# Patient Record
Sex: MALE | NOT HISPANIC OR LATINO | URBAN - METROPOLITAN AREA
[De-identification: names, ages, dates, MRNs, and addresses within clinical notes are randomized per-mention and may not be internally consistent; named-entity substitution may affect disease eponyms.]

---

## 2023-03-26 PROBLEM — G93.89 BRAIN MASS: Status: ACTIVE | Noted: 2023-03-26

## 2023-03-26 PROBLEM — J44.9 COPD (CHRONIC OBSTRUCTIVE PULMONARY DISEASE) (MULTI): Status: ACTIVE | Noted: 2023-03-26

## 2023-03-26 PROBLEM — R53.1 LEFT-SIDED WEAKNESS: Status: ACTIVE | Noted: 2023-03-26

## 2023-03-26 PROBLEM — I10 HYPERTENSION, ESSENTIAL: Status: ACTIVE | Noted: 2023-03-26

## 2023-03-26 NOTE — PROGRESS NOTES
PROGRESS NOTE  Subjective   Chief complaint: Amber De La Rosa is a 82 y.o. male who is an acute skilled patient being seen and evaluated for weakness    HPI:  3/27/2023 patient admitted to skilled nursing facility for therapy due to weakness after recent hospitalization for new onset left-sided weakness.  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  CT chest with 12 mm pulmonary nodule in the left lower lobe and 6 mm nodule in the right middle lobe.  Patient was admitted to the hospital and seen by neurology and neurosurgery.  He underwent surgery on brain and is now admitted to skilled nursing facility for therapy.      Objective   Vital signs: 125/69, 20, 97.7, 96%, blood sugar 153    Physical Exam  Constitutional:       General: He is not in acute distress.  Eyes:      Extraocular Movements: Extraocular movements intact.   Cardiovascular:      Rate and Rhythm: Regular rhythm.   Pulmonary:      Effort: Pulmonary effort is normal.      Breath sounds: Normal breath sounds.   Abdominal:      General: Bowel sounds are normal.      Palpations: Abdomen is soft.   Musculoskeletal:      Cervical back: Neck supple.      Right lower leg: No edema.      Left lower leg: No edema.      Comments: Left-sided weakness   Skin:     Comments: Small incision to top of scalp edges approximated no redness no drainage   Neurological:      Mental Status: He is alert.   Psychiatric:         Mood and Affect: Mood normal.         Behavior: Behavior is cooperative.         Assessment/Plan   Problem List Items Addressed This Visit       Brain mass     Follow-up with specialist         Hypertension, essential     Continue antihypertensives  Monitor blood pressure         Left-sided weakness - Primary     Therapy to eval and treat          Medications, treatments, and labs reviewed  Continue medications and treatments as listed in [facility]    Rachel Gaitan, APRN-CNP

## 2023-03-27 ENCOUNTER — NURSING HOME VISIT (OUTPATIENT)
Dept: POST ACUTE CARE | Facility: EXTERNAL LOCATION | Age: 83
End: 2023-03-27
Payer: COMMERCIAL

## 2023-03-27 DIAGNOSIS — R53.1 LEFT-SIDED WEAKNESS: Primary | ICD-10-CM

## 2023-03-27 DIAGNOSIS — G93.89 BRAIN MASS: ICD-10-CM

## 2023-03-27 DIAGNOSIS — I10 HYPERTENSION, ESSENTIAL: ICD-10-CM

## 2023-03-27 PROCEDURE — 99308 SBSQ NF CARE LOW MDM 20: CPT | Performed by: NURSE PRACTITIONER

## 2023-03-27 NOTE — LETTER
Patient: Amber De La Rosa  : 1940    Encounter Date: 2023    PROGRESS NOTE  Subjective  Chief complaint: Amber De La Rosa is a 82 y.o. male who is an acute skilled patient being seen and evaluated for weakness    HPI:  3/27/2023 patient admitted to skilled nursing facility for therapy due to weakness after recent hospitalization for new onset left-sided weakness.  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  CT chest with 12 mm pulmonary nodule in the left lower lobe and 6 mm nodule in the right middle lobe.  Patient was admitted to the hospital and seen by neurology and neurosurgery.  He underwent surgery on brain and is now admitted to skilled nursing facility for therapy.      Objective  Vital signs: 125/69, 20, 97.7, 96%, blood sugar 153    Physical Exam  Constitutional:       General: He is not in acute distress.  Eyes:      Extraocular Movements: Extraocular movements intact.   Cardiovascular:      Rate and Rhythm: Regular rhythm.   Pulmonary:      Effort: Pulmonary effort is normal.      Breath sounds: Normal breath sounds.   Abdominal:      General: Bowel sounds are normal.      Palpations: Abdomen is soft.   Musculoskeletal:      Cervical back: Neck supple.      Right lower leg: No edema.      Left lower leg: No edema.      Comments: Left-sided weakness   Skin:     Comments: Small incision to top of scalp edges approximated no redness no drainage   Neurological:      Mental Status: He is alert.   Psychiatric:         Mood and Affect: Mood normal.         Behavior: Behavior is cooperative.         Assessment/Plan  Problem List Items Addressed This Visit       Brain mass     Follow-up with specialist         Hypertension, essential     Continue antihypertensives  Monitor blood pressure         Left-sided weakness - Primary     Therapy to eval and treat          Medications, treatments, and labs reviewed  Continue medications and treatments as listed in [facility]    Rachel Gaitan,  APRN-CNP      Electronically Signed By: MICHEL Canales   3/27/23  6:51 PM

## 2023-03-28 ENCOUNTER — NURSING HOME VISIT (OUTPATIENT)
Dept: POST ACUTE CARE | Facility: EXTERNAL LOCATION | Age: 83
End: 2023-03-28
Payer: COMMERCIAL

## 2023-03-28 DIAGNOSIS — R53.1 LEFT-SIDED WEAKNESS: ICD-10-CM

## 2023-03-28 DIAGNOSIS — I10 HYPERTENSION, ESSENTIAL: ICD-10-CM

## 2023-03-28 DIAGNOSIS — G93.89 BRAIN MASS: Primary | ICD-10-CM

## 2023-03-28 PROCEDURE — 99306 1ST NF CARE HIGH MDM 50: CPT | Performed by: INTERNAL MEDICINE

## 2023-03-28 NOTE — PROGRESS NOTES
HISTORY & PHYSICAL  Subjective   Chief complaint: Amber De La Rosa is a 82 y.o. male who is a acute skilled care patient being seen and evaluated for multiple medical problems.  Patient presents for weakness    HPI:  82 years old male with past medical history of brain tumor, polyp, GERD, hyperlipidemia, hypertension.  Patient admitted to skilled nursing facility for therapy due to weakness after recent hospitalization for new onset left-sided weakness.  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  CT chest with 12 mm pulmonary nodule in the left lower lobe and 6 mm nodule in the right middle lobe.  Patient was admitted to the hospital and seen by neurology and neurosurgery.  He underwent surgery on brain and is now admitted to skilled nursing facility for therapy.        Past Medical History:   Diagnosis Date    Colon polyps     GERD (gastroesophageal reflux disease)     Hyperlipidemia     Hypertension, essential        Past Surgical History:   Procedure Laterality Date    COLONOSCOPY      UPPER GASTROINTESTINAL ENDOSCOPY         Family History   Problem Relation Name Age of Onset    COPD Father      Leukemia Sister      Uterine cancer Sister      Breast cancer Sister      Throat cancer Brother         Social History     Socioeconomic History    Marital status:      Spouse name: Not on file    Number of children: Not on file    Years of education: Not on file    Highest education level: Not on file   Occupational History    Not on file   Tobacco Use    Smoking status: Never    Smokeless tobacco: Not on file   Vaping Use    Vaping status: Not on file   Substance and Sexual Activity    Alcohol use: Yes    Drug use: Not on file    Sexual activity: Not on file   Other Topics Concern    Not on file   Social History Narrative    Not on file     Social Determinants of Health     Financial Resource Strain: Not on file   Food Insecurity: Not on file   Transportation Needs: Not on file    Physical Activity: Not on file   Stress: Not on file   Social Connections: Not on file   Intimate Partner Violence: Not on file   Housing Stability: Not on file       Vital signs: 130 over 80/80/18    Objective   Physical Exam  Vitals reviewed.   Constitutional:       Appearance: Normal appearance.   HENT:      Head: Normocephalic and atraumatic.   Cardiovascular:      Rate and Rhythm: Normal rate and regular rhythm.   Pulmonary:      Effort: Pulmonary effort is normal.      Breath sounds: Normal breath sounds.   Abdominal:      General: Bowel sounds are normal.      Palpations: Abdomen is soft.   Musculoskeletal:      Cervical back: Neck supple.   Skin:     General: Skin is warm and dry.   Neurological:      General: No focal deficit present.      Mental Status: He is alert.      Comments: Right-sided weakness   Psychiatric:         Mood and Affect: Mood normal.         Behavior: Behavior is cooperative.         Assessment/Plan   Problem List Items Addressed This Visit          Nervous    Left-sided weakness       Circulatory    Hypertension, essential     Continue antihypertensives  Monitor blood pressure            Other    Brain mass - Primary     Brain tumor underwent neurosurgery sent to the nursing home for rehab          Medications, treatments, and labs reviewed  Continue medications and treatments as listed in [facility]    Dasia Barton MD

## 2023-03-28 NOTE — LETTER
Patient: Amber De La Rosa  : 1940    Encounter Date: 2023    HISTORY & PHYSICAL  Subjective  Chief complaint: Amber De La Rosa is a 82 y.o. male who is a acute skilled care patient being seen and evaluated for multiple medical problems.  Patient presents for weakness    HPI:  82 years old male with past medical history of brain tumor, polyp, GERD, hyperlipidemia, hypertension.  Patient admitted to skilled nursing facility for therapy due to weakness after recent hospitalization for new onset left-sided weakness.  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  CT chest with 12 mm pulmonary nodule in the left lower lobe and 6 mm nodule in the right middle lobe.  Patient was admitted to the hospital and seen by neurology and neurosurgery.  He underwent surgery on brain and is now admitted to skilled nursing facility for therapy.        Past Medical History:   Diagnosis Date   • Colon polyps    • GERD (gastroesophageal reflux disease)    • Hyperlipidemia    • Hypertension, essential        Past Surgical History:   Procedure Laterality Date   • COLONOSCOPY     • UPPER GASTROINTESTINAL ENDOSCOPY         Family History   Problem Relation Name Age of Onset   • COPD Father     • Leukemia Sister     • Uterine cancer Sister     • Breast cancer Sister     • Throat cancer Brother         Social History     Socioeconomic History   • Marital status:      Spouse name: Not on file   • Number of children: Not on file   • Years of education: Not on file   • Highest education level: Not on file   Occupational History   • Not on file   Tobacco Use   • Smoking status: Never   • Smokeless tobacco: Not on file   Vaping Use   • Vaping status: Not on file   Substance and Sexual Activity   • Alcohol use: Yes   • Drug use: Not on file   • Sexual activity: Not on file   Other Topics Concern   • Not on file   Social History Narrative   • Not on file     Social Determinants of Health     Financial Resource  Strain: Not on file   Food Insecurity: Not on file   Transportation Needs: Not on file   Physical Activity: Not on file   Stress: Not on file   Social Connections: Not on file   Intimate Partner Violence: Not on file   Housing Stability: Not on file       Vital signs: 130 over 80/80/18    Objective  Physical Exam  Vitals reviewed.   Constitutional:       Appearance: Normal appearance.   HENT:      Head: Normocephalic and atraumatic.   Cardiovascular:      Rate and Rhythm: Normal rate and regular rhythm.   Pulmonary:      Effort: Pulmonary effort is normal.      Breath sounds: Normal breath sounds.   Abdominal:      General: Bowel sounds are normal.      Palpations: Abdomen is soft.   Musculoskeletal:      Cervical back: Neck supple.   Skin:     General: Skin is warm and dry.   Neurological:      General: No focal deficit present.      Mental Status: He is alert.      Comments: Right-sided weakness   Psychiatric:         Mood and Affect: Mood normal.         Behavior: Behavior is cooperative.         Assessment/Plan  Problem List Items Addressed This Visit    None    Medications, treatments, and labs reviewed  Continue medications and treatments as listed in [facility]    Dasia Barton MD      Electronically Signed By: Dasia Barton MD   3/28/23  7:15 PM

## 2023-03-29 ENCOUNTER — NURSING HOME VISIT (OUTPATIENT)
Dept: POST ACUTE CARE | Facility: EXTERNAL LOCATION | Age: 83
End: 2023-03-29
Payer: COMMERCIAL

## 2023-03-29 DIAGNOSIS — I10 HYPERTENSION, ESSENTIAL: ICD-10-CM

## 2023-03-29 DIAGNOSIS — G93.89 BRAIN MASS: ICD-10-CM

## 2023-03-29 DIAGNOSIS — R53.1 LEFT-SIDED WEAKNESS: Primary | ICD-10-CM

## 2023-03-29 PROCEDURE — 99308 SBSQ NF CARE LOW MDM 20: CPT | Performed by: NURSE PRACTITIONER

## 2023-03-29 NOTE — LETTER
Patient: Amber De La Rosa  : 1940    Encounter Date: 2023    PROGRESS NOTE  Subjective  Chief complaint: Amber De La Rosa is a 82 y.o. male who is an acute skilled patient being seen and evaluated for weakness    HPI:  3/28/2023 82 years old male with past medical history of brain tumor, polyp, GERD, hyperlipidemia, hypertension.  Patient admitted to skilled nursing facility for therapy due to weakness after recent hospitalization for new onset left-sided weakness.  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  CT chest with 12 mm pulmonary nodule in the left lower lobe and 6 mm nodule in the right middle lobe.  Patient was admitted to the hospital and seen by neurology and neurosurgery.  He underwent surgery on brain and is now admitted to skilled nursing facility for therapy.    3/29/2023 patient is working in PT [facility] and [employer].  He is able to walk up to 10 feet in the parallel bars with moderate to maximum assist with close wheelchair follow.  He requires moderate to maximum assist for sit to stand with pulling up on the parallel bars.  Speech therapy is working with him on safe swallowing and cognition.  Daughter at bedside and reviewed medication regimen.  No new concerns today.      Objective  Vital signs: 129/69, 16, 97.1, 73, 98%, blood sugar 124    Physical Exam  Constitutional:       General: He is not in acute distress.  Eyes:      Extraocular Movements: Extraocular movements intact.   Cardiovascular:      Rate and Rhythm: Regular rhythm.   Pulmonary:      Effort: Pulmonary effort is normal.      Breath sounds: Normal breath sounds.   Abdominal:      General: Bowel sounds are normal.      Palpations: Abdomen is soft.   Musculoskeletal:      Cervical back: Neck supple.      Right lower leg: No edema.      Left lower leg: No edema.      Comments: Left-sided weakness   Skin:     Comments: Small incision to top of scalp edges approximated no redness no drainage   Neurological:       Mental Status: He is alert.   Psychiatric:         Mood and Affect: Mood normal.         Behavior: Behavior is cooperative.         Assessment/Plan  Problem List Items Addressed This Visit       Brain mass     Follow-up with specialist         Hypertension, essential     Controlled  Continue antihypertensives  Monitor blood pressure         Left-sided weakness - Primary     Continue therapy          Medications, treatments, and labs reviewed  Continue medications and treatments as listed in [facility]    MICHEL Canales      Electronically Signed By: MICHEL Canales   3/30/23  1:28 PM

## 2023-03-30 ENCOUNTER — NURSING HOME VISIT (OUTPATIENT)
Dept: POST ACUTE CARE | Facility: EXTERNAL LOCATION | Age: 83
End: 2023-03-30
Payer: COMMERCIAL

## 2023-03-30 DIAGNOSIS — E87.1 HYPONATREMIA: ICD-10-CM

## 2023-03-30 DIAGNOSIS — I10 HYPERTENSION, ESSENTIAL: ICD-10-CM

## 2023-03-30 DIAGNOSIS — G93.89 BRAIN MASS: ICD-10-CM

## 2023-03-30 DIAGNOSIS — R53.1 LEFT-SIDED WEAKNESS: Primary | ICD-10-CM

## 2023-03-30 PROCEDURE — 99308 SBSQ NF CARE LOW MDM 20: CPT | Performed by: NURSE PRACTITIONER

## 2023-03-30 NOTE — LETTER
Patient: Amber De La Rosa  : 1940    Encounter Date: 2023    PROGRESS NOTE  Subjective  Chief complaint: Amber De La Rosa is a 82 y.o. male who is an acute skilled patient being seen and evaluated for weakness and abnormal lab    HPI:  3/28/2023 82 years old male with past medical history of brain tumor, polyp, GERD, hyperlipidemia, hypertension.  Patient admitted to skilled nursing facility for therapy due to weakness after recent hospitalization for new onset left-sided weakness.  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  CT chest with 12 mm pulmonary nodule in the left lower lobe and 6 mm nodule in the right middle lobe.  Patient was admitted to the hospital and seen by neurology and neurosurgery.  He underwent surgery on brain and is now admitted to skilled nursing facility for therapy.    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  He is able to walk up to 10 feet in the parallel bars with moderate to maximum assist with close wheelchair follow.  He requires moderate to maximum assist for sit to stand with pulling up on the parallel bars.  Speech therapy is working with him on safe swallowing and cognition.  Daughter at bedside and reviewed medication regimen.  No new concerns today.     patient is at skilled nursing facility for therapy.  He had follow-up with specialist yesterday and returned with new orders for memantine.  Nurse reported that the patient's labs were obtained at appointment and showed sodium 124.  Patient was placed on a fluid restriction and sent back to nursing facility.  Family at bedside today to discuss plan of care.  Family states patient has been more sleepy than usual today although notes that he did not sleep well last night.  Patient is awake and talking/interacting in conversation.        Objective  Vital signs: 128/65, 17, 97.5, 70, 98%, blood sugar 204    Physical Exam  Constitutional:       General: He is not in acute  distress.  Eyes:      Extraocular Movements: Extraocular movements intact.   Cardiovascular:      Rate and Rhythm: Regular rhythm.   Pulmonary:      Effort: Pulmonary effort is normal.      Breath sounds: Normal breath sounds.   Abdominal:      General: Bowel sounds are normal.      Palpations: Abdomen is soft.   Musculoskeletal:      Cervical back: Neck supple.      Right lower leg: No edema.      Left lower leg: No edema.      Comments: Left-sided weakness   Skin:     Comments: Small incision to top of scalp edges approximated no redness no drainage   Neurological:      Mental Status: He is alert.   Psychiatric:         Mood and Affect: Mood normal.         Behavior: Behavior is cooperative.         Assessment/Plan  Problem List Items Addressed This Visit       Brain mass     Follow-up with specialist         Hypertension, essential     Controlled  Continue antihypertensives  Monitor blood pressure         Hyponatremia     Patient is slightly sleepy but interacting appropriately  Will repeat BMP stat  Family aware and agrees with plan         Left-sided weakness - Primary     Continue therapy          Medications, treatments, and labs reviewed  Continue medications and treatments as listed in [facility]    MICHEL Canales      Electronically Signed By: MICHEL Canales   3/30/23  5:17 PM

## 2023-03-30 NOTE — PROGRESS NOTES
PROGRESS NOTE  Subjective   Chief complaint: Amber De La Rosa is a 82 y.o. male who is an acute skilled patient being seen and evaluated for weakness and abnormal lab    HPI:  3/28/2023 82 years old male with past medical history of brain tumor, polyp, GERD, hyperlipidemia, hypertension.  Patient admitted to skilled nursing facility for therapy due to weakness after recent hospitalization for new onset left-sided weakness.  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  CT chest with 12 mm pulmonary nodule in the left lower lobe and 6 mm nodule in the right middle lobe.  Patient was admitted to the hospital and seen by neurology and neurosurgery.  He underwent surgery on brain and is now admitted to skilled nursing facility for therapy.    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  He is able to walk up to 10 feet in the parallel bars with moderate to maximum assist with close wheelchair follow.  He requires moderate to maximum assist for sit to stand with pulling up on the parallel bars.  Speech therapy is working with him on safe swallowing and cognition.  Daughter at bedside and reviewed medication regimen.  No new concerns today.    2/30/2023 patient is at skilled nursing facility for therapy.  He had follow-up with specialist yesterday and returned with new orders for memantine.  Nurse reported that the patient's labs were obtained at appointment and showed sodium 124.  Patient was placed on a fluid restriction and sent back to nursing facility.  Family at bedside today to discuss plan of care.  Family states patient has been more sleepy than usual today although notes that he did not sleep well last night.  Patient is awake and talking/interacting in conversation.        Objective   Vital signs: 128/65, 17, 97.5, 70, 98%, blood sugar 204    Physical Exam  Constitutional:       General: He is not in acute distress.  Eyes:      Extraocular Movements: Extraocular movements intact.    Cardiovascular:      Rate and Rhythm: Regular rhythm.   Pulmonary:      Effort: Pulmonary effort is normal.      Breath sounds: Normal breath sounds.   Abdominal:      General: Bowel sounds are normal.      Palpations: Abdomen is soft.   Musculoskeletal:      Cervical back: Neck supple.      Right lower leg: No edema.      Left lower leg: No edema.      Comments: Left-sided weakness   Skin:     Comments: Small incision to top of scalp edges approximated no redness no drainage   Neurological:      Mental Status: He is alert.   Psychiatric:         Mood and Affect: Mood normal.         Behavior: Behavior is cooperative.         Assessment/Plan   Problem List Items Addressed This Visit       Brain mass     Follow-up with specialist         Hypertension, essential     Controlled  Continue antihypertensives  Monitor blood pressure         Hyponatremia     Patient is slightly sleepy but interacting appropriately  Will repeat BMP stat  Family aware and agrees with plan         Left-sided weakness - Primary     Continue therapy          Medications, treatments, and labs reviewed  Continue medications and treatments as listed in [facility]    KETTY Canales-CNP

## 2023-03-30 NOTE — ASSESSMENT & PLAN NOTE
Patient is slightly sleepy but interacting appropriately  Will repeat BMP stat  Family aware and agrees with plan

## 2023-03-30 NOTE — PROGRESS NOTES
PROGRESS NOTE  Subjective   Chief complaint: Amber De La Rosa is a 82 y.o. male who is an acute skilled patient being seen and evaluated for weakness    HPI:  3/28/2023 82 years old male with past medical history of brain tumor, polyp, GERD, hyperlipidemia, hypertension.  Patient admitted to skilled nursing facility for therapy due to weakness after recent hospitalization for new onset left-sided weakness.  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  CT chest with 12 mm pulmonary nodule in the left lower lobe and 6 mm nodule in the right middle lobe.  Patient was admitted to the hospital and seen by neurology and neurosurgery.  He underwent surgery on brain and is now admitted to skilled nursing facility for therapy.    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  He is able to walk up to 10 feet in the parallel bars with moderate to maximum assist with close wheelchair follow.  He requires moderate to maximum assist for sit to stand with pulling up on the parallel bars.  Speech therapy is working with him on safe swallowing and cognition.  Daughter at bedside and reviewed medication regimen.  No new concerns today.      Objective   Vital signs: 129/69, 16, 97.1, 73, 98%, blood sugar 124    Physical Exam  Constitutional:       General: He is not in acute distress.  Eyes:      Extraocular Movements: Extraocular movements intact.   Cardiovascular:      Rate and Rhythm: Regular rhythm.   Pulmonary:      Effort: Pulmonary effort is normal.      Breath sounds: Normal breath sounds.   Abdominal:      General: Bowel sounds are normal.      Palpations: Abdomen is soft.   Musculoskeletal:      Cervical back: Neck supple.      Right lower leg: No edema.      Left lower leg: No edema.      Comments: Left-sided weakness   Skin:     Comments: Small incision to top of scalp edges approximated no redness no drainage   Neurological:      Mental Status: He is alert.   Psychiatric:         Mood and  Affect: Mood normal.         Behavior: Behavior is cooperative.         Assessment/Plan   Problem List Items Addressed This Visit       Brain mass     Follow-up with specialist         Hypertension, essential     Controlled  Continue antihypertensives  Monitor blood pressure         Left-sided weakness - Primary     Continue therapy          Medications, treatments, and labs reviewed  Continue medications and treatments as listed in [facility]    Rachel Gaitan, KETTY-CNP

## 2023-03-31 ENCOUNTER — NURSING HOME VISIT (OUTPATIENT)
Dept: POST ACUTE CARE | Facility: EXTERNAL LOCATION | Age: 83
End: 2023-03-31
Payer: COMMERCIAL

## 2023-03-31 DIAGNOSIS — R53.1 WEAKNESS: ICD-10-CM

## 2023-03-31 DIAGNOSIS — Z79.4 TYPE 2 DIABETES MELLITUS WITH OTHER SPECIFIED COMPLICATION, WITH LONG-TERM CURRENT USE OF INSULIN (MULTI): ICD-10-CM

## 2023-03-31 DIAGNOSIS — E11.69 TYPE 2 DIABETES MELLITUS WITH OTHER SPECIFIED COMPLICATION, WITH LONG-TERM CURRENT USE OF INSULIN (MULTI): ICD-10-CM

## 2023-03-31 DIAGNOSIS — I10 HYPERTENSION, ESSENTIAL: ICD-10-CM

## 2023-03-31 PROCEDURE — 99308 SBSQ NF CARE LOW MDM 20: CPT | Performed by: INTERNAL MEDICINE

## 2023-03-31 NOTE — LETTER
Patient: Amber De La Rosa  : 1940    Encounter Date: 2023    Subjective  Chief complaint: Amber De La Rosa is a 82 y.o. male who is a acute skilled care patient being seen and evaluated for weakness    HPI:  3/28/2023 82 years old male with past medical history of brain tumor, polyp, GERD, hyperlipidemia, hypertension.  Patient admitted to skilled nursing facility for therapy due to weakness after recent hospitalization for new onset left-sided weakness.  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  CT chest with 12 mm pulmonary nodule in the left lower lobe and 6 mm nodule in the right middle lobe.  Patient was admitted to the hospital and seen by neurology and neurosurgery.  He underwent surgery on brain and is now admitted to skilled nursing facility for therapy.    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  He is able to walk up to 10 feet in the parallel bars with moderate to maximum assist with close wheelchair follow.  He requires moderate to maximum assist for sit to stand with pulling up on the parallel bars.  Speech therapy is working with him on safe swallowing and cognition.  Daughter at bedside and reviewed medication regimen.  No new concerns today.    3/30/2023 patient is at skilled nursing facility for therapy.  He had follow-up with specialist yesterday and returned with new orders for memantine.  Nurse reported that the patient's labs were obtained at appointment and showed sodium 124.  Patient was placed on a fluid restriction and sent back to nursing facility.  Family at bedside today to discuss plan of care.  Family states patient has been more sleepy than usual today although notes that he did not sleep well last night.  Patient is awake and talking/interacting in conversation.      3/31/23 Patient working in therapy. He is doing well and in good spirits. He is getting stronger. Patient  able to walk up to 10 feet in the parallel bars with moderate to maximum  assist with close wheelchair follow. No acute distress at this time.         Review of Systems  All systems reviewed and negative except for what was mentioned in the HPI    Vital signs: 122/64, 64, 18, 99%     Objective  Physical Exam  Constitutional:       General: He is not in acute distress.  Eyes:      Extraocular Movements: Extraocular movements intact.   Cardiovascular:      Rate and Rhythm: Regular rhythm.   Pulmonary:      Effort: Pulmonary effort is normal.      Breath sounds: Normal breath sounds.   Abdominal:      General: Bowel sounds are normal.      Palpations: Abdomen is soft.   Musculoskeletal:      Cervical back: Neck supple.      Right lower leg: No edema.      Left lower leg: No edema.   Neurological:      Mental Status: He is alert.   Psychiatric:         Mood and Affect: Mood normal.         Behavior: Behavior is cooperative.         Assessment/Plan  Problem List Items Addressed This Visit          Circulatory    Hypertension, essential     Controlled  Continue antihypertensives  Monitor blood pressure            Endocrine/Metabolic    DM (diabetes mellitus) (CMS/[facility])     Monitor BS  Continue SSI            Other    Weakness     Continue therapy          Medications, treatments, and labs reviewed  Continue medications and treatments as listed in PCC    Scribe Attestation  By signing my name below, I, Nyla Stephens, Scribe   attest that this documentation has been prepared under the direction and in the presence of Dasia Barton MD.    Provider Attestation - Scribe documentation  All medical record entries made by the Scribe were at my direction and personally dictated by me. I have reviewed the chart and agree that the record accurately reflects my personal performance of the history, physical exam, discussion and plan.      Electronically Signed By: Dasia Barton MD   4/3/23  5:42 PM

## 2023-04-03 ENCOUNTER — NURSING HOME VISIT (OUTPATIENT)
Dept: POST ACUTE CARE | Facility: EXTERNAL LOCATION | Age: 83
End: 2023-04-03
Payer: COMMERCIAL

## 2023-04-03 DIAGNOSIS — R13.10 DYSPHAGIA, UNSPECIFIED TYPE: ICD-10-CM

## 2023-04-03 DIAGNOSIS — G93.89 BRAIN MASS: ICD-10-CM

## 2023-04-03 DIAGNOSIS — R53.1 WEAKNESS: Primary | ICD-10-CM

## 2023-04-03 DIAGNOSIS — I10 HYPERTENSION, ESSENTIAL: ICD-10-CM

## 2023-04-03 PROBLEM — E11.9 DM (DIABETES MELLITUS) (MULTI): Status: ACTIVE | Noted: 2023-04-03

## 2023-04-03 PROCEDURE — 99308 SBSQ NF CARE LOW MDM 20: CPT | Performed by: NURSE PRACTITIONER

## 2023-04-03 NOTE — LETTER
Patient: Amber De La Rosa  : 1940    Encounter Date: 2023    PROGRESS NOTE    Subjective  Chief complaint: Amber De La Rosa is a 82 y.o. male who is an acute skilled patient being seen and evaluated for weakness    HPI:  3/28/2023 82 years old male with past medical history of brain tumor, polyp, GERD, hyperlipidemia, hypertension.  Patient admitted to skilled nursing facility for therapy due to weakness after recent hospitalization for new onset left-sided weakness.  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  CT chest with 12 mm pulmonary nodule in the left lower lobe and 6 mm nodule in the right middle lobe.  Patient was admitted to the hospital and seen by neurology and neurosurgery.  He underwent surgery on brain and is now admitted to skilled nursing facility for therapy.    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  He is able to walk up to 10 feet in the parallel bars with moderate to maximum assist with close wheelchair follow.  He requires moderate to maximum assist for sit to stand with pulling up on the parallel bars.  Speech therapy is working with him on safe swallowing and cognition.  Daughter at bedside and reviewed medication regimen.  No new concerns today.    3/30/2023 patient is at skilled nursing facility for therapy.  He had follow-up with specialist yesterday and returned with new orders for memantine.  Nurse reported that the patient's labs were obtained at appointment and showed sodium 124.  Patient was placed on a fluid restriction and sent back to nursing facility.  Family at bedside today to discuss plan of care.  Family states patient has been more sleepy than usual today although notes that he did not sleep well last night.  Patient is awake and talking/interacting in conversation.      3/31/23 Patient working in therapy. He is doing well and in good spirits. He is getting stronger. Patient  able to walk up to 10 feet in the parallel bars with  moderate to maximum assist with close wheelchair follow. No acute distress at this time.     4/3/23 Patient is at SNF for therapy.  He is able to walk short distances up to 10 feet in parallel bars with moderate to maximum assist.  He is also working with speech therapy for dysphagia.  Patient states he feels well and has no new concerns today.      Objective  Vital signs: 113/55,     Physical Exam  Constitutional:       General: He is not in acute distress.  Eyes:      Extraocular Movements: Extraocular movements intact.   Cardiovascular:      Rate and Rhythm: Regular rhythm.   Pulmonary:      Effort: Pulmonary effort is normal.      Breath sounds: Normal breath sounds.   Abdominal:      General: Bowel sounds are normal.      Palpations: Abdomen is soft.   Musculoskeletal:      Cervical back: Neck supple.      Right lower leg: No edema.      Left lower leg: No edema.      Comments: Left-sided weakness   Neurological:      Mental Status: He is alert.   Psychiatric:         Mood and Affect: Mood normal.         Behavior: Behavior is cooperative.         Assessment/Plan  Problem List Items Addressed This Visit       Brain mass     Follow-up with specialist           Dysphagia     Soft diet  ST         Hypertension, essential     Controlled  Continue antihypertensives  Monitor blood pressure         Weakness - Primary     Continue therapy           Medications, treatments, and labs reviewed  Continue medications and treatments as listed in [facility]    MICHEL Canales      Electronically Signed By: MICHEL Canales   4/4/23  7:36 PM

## 2023-04-03 NOTE — PROGRESS NOTES
Subjective   Chief complaint: Amber De La Rosa is a 82 y.o. male who is a acute skilled care patient being seen and evaluated for weakness    HPI:  3/28/2023 82 years old male with past medical history of brain tumor, polyp, GERD, hyperlipidemia, hypertension.  Patient admitted to skilled nursing facility for therapy due to weakness after recent hospitalization for new onset left-sided weakness.  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  CT chest with 12 mm pulmonary nodule in the left lower lobe and 6 mm nodule in the right middle lobe.  Patient was admitted to the hospital and seen by neurology and neurosurgery.  He underwent surgery on brain and is now admitted to skilled nursing facility for therapy.    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  He is able to walk up to 10 feet in the parallel bars with moderate to maximum assist with close wheelchair follow.  He requires moderate to maximum assist for sit to stand with pulling up on the parallel bars.  Speech therapy is working with him on safe swallowing and cognition.  Daughter at bedside and reviewed medication regimen.  No new concerns today.    3/30/2023 patient is at skilled nursing facility for therapy.  He had follow-up with specialist yesterday and returned with new orders for memantine.  Nurse reported that the patient's labs were obtained at appointment and showed sodium 124.  Patient was placed on a fluid restriction and sent back to nursing facility.  Family at bedside today to discuss plan of care.  Family states patient has been more sleepy than usual today although notes that he did not sleep well last night.  Patient is awake and talking/interacting in conversation.      3/31/23 Patient working in therapy. He is doing well and in good spirits. He is getting stronger. Patient  able to walk up to 10 feet in the parallel bars with moderate to maximum assist with close wheelchair follow. No acute distress at this  time.         Review of Systems  All systems reviewed and negative except for what was mentioned in the HPI    Vital signs: 122/64, 64, 18, 99%     Objective   Physical Exam  Constitutional:       General: He is not in acute distress.  Eyes:      Extraocular Movements: Extraocular movements intact.   Cardiovascular:      Rate and Rhythm: Regular rhythm.   Pulmonary:      Effort: Pulmonary effort is normal.      Breath sounds: Normal breath sounds.   Abdominal:      General: Bowel sounds are normal.      Palpations: Abdomen is soft.   Musculoskeletal:      Cervical back: Neck supple.      Right lower leg: No edema.      Left lower leg: No edema.   Neurological:      Mental Status: He is alert.   Psychiatric:         Mood and Affect: Mood normal.         Behavior: Behavior is cooperative.         Assessment/Plan   Problem List Items Addressed This Visit          Circulatory    Hypertension, essential     Controlled  Continue antihypertensives  Monitor blood pressure            Endocrine/Metabolic    DM (diabetes mellitus) (CMS/[facility])     Monitor BS  Continue SSI            Other    Weakness     Continue therapy          Medications, treatments, and labs reviewed  Continue medications and treatments as listed in PCC    Scribe Attestation  By signing my name below, INyla Scribe   attest that this documentation has been prepared under the direction and in the presence of Dasia Barton MD.    Provider Attestation - Scribe documentation  All medical record entries made by the Scribe were at my direction and personally dictated by me. I have reviewed the chart and agree that the record accurately reflects my personal performance of the history, physical exam, discussion and plan.

## 2023-04-04 ENCOUNTER — NURSING HOME VISIT (OUTPATIENT)
Dept: POST ACUTE CARE | Facility: EXTERNAL LOCATION | Age: 83
End: 2023-04-04
Payer: COMMERCIAL

## 2023-04-04 DIAGNOSIS — R53.1 WEAKNESS: ICD-10-CM

## 2023-04-04 DIAGNOSIS — Z79.4 TYPE 2 DIABETES MELLITUS WITH OTHER SPECIFIED COMPLICATION, WITH LONG-TERM CURRENT USE OF INSULIN (MULTI): ICD-10-CM

## 2023-04-04 DIAGNOSIS — I10 HYPERTENSION, ESSENTIAL: ICD-10-CM

## 2023-04-04 DIAGNOSIS — E11.69 TYPE 2 DIABETES MELLITUS WITH OTHER SPECIFIED COMPLICATION, WITH LONG-TERM CURRENT USE OF INSULIN (MULTI): ICD-10-CM

## 2023-04-04 PROBLEM — R13.10 DYSPHAGIA: Status: ACTIVE | Noted: 2023-04-04

## 2023-04-04 PROCEDURE — 99308 SBSQ NF CARE LOW MDM 20: CPT | Performed by: INTERNAL MEDICINE

## 2023-04-04 NOTE — PROGRESS NOTES
PROGRESS NOTE    Subjective   Chief complaint: Amber De La Rosa is a 82 y.o. male who is an acute skilled patient being seen and evaluated for weakness    HPI:  3/28/2023 82 years old male with past medical history of brain tumor, polyp, GERD, hyperlipidemia, hypertension.  Patient admitted to skilled nursing facility for therapy due to weakness after recent hospitalization for new onset left-sided weakness.  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  CT chest with 12 mm pulmonary nodule in the left lower lobe and 6 mm nodule in the right middle lobe.  Patient was admitted to the hospital and seen by neurology and neurosurgery.  He underwent surgery on brain and is now admitted to skilled nursing facility for therapy.    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  He is able to walk up to 10 feet in the parallel bars with moderate to maximum assist with close wheelchair follow.  He requires moderate to maximum assist for sit to stand with pulling up on the parallel bars.  Speech therapy is working with him on safe swallowing and cognition.  Daughter at bedside and reviewed medication regimen.  No new concerns today.    3/30/2023 patient is at skilled nursing facility for therapy.  He had follow-up with specialist yesterday and returned with new orders for memantine.  Nurse reported that the patient's labs were obtained at appointment and showed sodium 124.  Patient was placed on a fluid restriction and sent back to nursing facility.  Family at bedside today to discuss plan of care.  Family states patient has been more sleepy than usual today although notes that he did not sleep well last night.  Patient is awake and talking/interacting in conversation.      3/31/23 Patient working in therapy. He is doing well and in good spirits. He is getting stronger. Patient  able to walk up to 10 feet in the parallel bars with moderate to maximum assist with close wheelchair follow. No acute  distress at this time.     4/3/23 Patient is at SNF for therapy.  He is able to walk short distances up to 10 feet in parallel bars with moderate to maximum assist.  He is also working with speech therapy for dysphagia.  Patient states he feels well and has no new concerns today.      Objective   Vital signs: 113/55,     Physical Exam  Constitutional:       General: He is not in acute distress.  Eyes:      Extraocular Movements: Extraocular movements intact.   Cardiovascular:      Rate and Rhythm: Regular rhythm.   Pulmonary:      Effort: Pulmonary effort is normal.      Breath sounds: Normal breath sounds.   Abdominal:      General: Bowel sounds are normal.      Palpations: Abdomen is soft.   Musculoskeletal:      Cervical back: Neck supple.      Right lower leg: No edema.      Left lower leg: No edema.      Comments: Left-sided weakness   Neurological:      Mental Status: He is alert.   Psychiatric:         Mood and Affect: Mood normal.         Behavior: Behavior is cooperative.         Assessment/Plan   Problem List Items Addressed This Visit       Brain mass     Follow-up with specialist           Dysphagia     Soft diet  ST         Hypertension, essential     Controlled  Continue antihypertensives  Monitor blood pressure         Weakness - Primary     Continue therapy           Medications, treatments, and labs reviewed  Continue medications and treatments as listed in PCC    Rachel Gaitan APRN-CNP

## 2023-04-04 NOTE — PROGRESS NOTES
Subjective   Chief complaint: Amber De La Rosa is a 82 y.o. male who is a acute skilled care patient being seen and evaluated for weakness    HPI:    Continues working in therapy due to weakness.   Patient requires moderate assistance for transfers ADLs and mobility at this time.  Denies constitutional complaints.  No acute distress.        Review of Systems  All systems reviewed and negative except for what was mentioned in the HPI    Vital signs:   113/55, 97.1, 51, 18, 97%, blood sugars 153    Objective   Physical Exam  Constitutional:       General: He is not in acute distress.  Eyes:      Extraocular Movements: Extraocular movements intact.   Cardiovascular:      Rate and Rhythm: Regular rhythm.   Pulmonary:      Effort: Pulmonary effort is normal.      Breath sounds: Normal breath sounds.   Abdominal:      General: Bowel sounds are normal.      Palpations: Abdomen is soft.   Musculoskeletal:      Cervical back: Neck supple.      Right lower leg: No edema.      Left lower leg: No edema.   Neurological:      Mental Status: He is alert.   Psychiatric:         Mood and Affect: Mood normal.         Behavior: Behavior is cooperative.         Assessment/Plan   Problem List Items Addressed This Visit          Circulatory    Hypertension, essential     Controlled  Continue antihypertensives  Monitor blood pressure            Endocrine/Metabolic    DM (diabetes mellitus) (CMS/[facility])     Monitor BS  Continue SSI            Other    Weakness     Continue therapy           Medications, treatments, and labs reviewed  Continue medications and treatments as listed in PCC    Scribe Attestation  By signing my name below, I, Tru Brandt   attest that this documentation has been prepared under the direction and in the presence of Dasia Barton MD.    Provider Attestation - Scribe documentation  All medical record entries made by the Scribe were at my direction and personally dictated by me. I have reviewed the chart and agree  that the record accurately reflects my personal performance of the history, physical exam, discussion and plan.

## 2023-04-04 NOTE — LETTER
Patient: Amber De La Rosa  : 1940    Encounter Date: 2023    Subjective  Chief complaint: Amber De La Rosa is a 82 y.o. male who is a acute skilled care patient being seen and evaluated for weakness    HPI:    Continues working in therapy due to weakness.   Patient requires moderate assistance for transfers ADLs and mobility at this time.  Denies constitutional complaints.  No acute distress.        Review of Systems  All systems reviewed and negative except for what was mentioned in the HPI    Vital signs:   113/55, 97.1, 51, 18, 97%, blood sugars 153    Objective  Physical Exam  Constitutional:       General: He is not in acute distress.  Eyes:      Extraocular Movements: Extraocular movements intact.   Cardiovascular:      Rate and Rhythm: Regular rhythm.   Pulmonary:      Effort: Pulmonary effort is normal.      Breath sounds: Normal breath sounds.   Abdominal:      General: Bowel sounds are normal.      Palpations: Abdomen is soft.   Musculoskeletal:      Cervical back: Neck supple.      Right lower leg: No edema.      Left lower leg: No edema.   Neurological:      Mental Status: He is alert.   Psychiatric:         Mood and Affect: Mood normal.         Behavior: Behavior is cooperative.         Assessment/Plan  Problem List Items Addressed This Visit          Circulatory    Hypertension, essential     Controlled  Continue antihypertensives  Monitor blood pressure            Endocrine/Metabolic    DM (diabetes mellitus) (CMS/[facility])     Monitor BS  Continue SSI            Other    Weakness     Continue therapy           Medications, treatments, and labs reviewed  Continue medications and treatments as listed in PCC    Scribe Attestation  By signing my name below, Nyla DE LA CRUZ Scribe   attest that this documentation has been prepared under the direction and in the presence of Dasia Barton MD.    Provider Attestation - Scribe documentation  All medical record entries made by the Scribe were at my direction and  personally dictated by me. I have reviewed the chart and agree that the record accurately reflects my personal performance of the history, physical exam, discussion and plan.      Electronically Signed By: Dasia Barton MD   4/4/23  6:38 PM

## 2023-04-05 ENCOUNTER — NURSING HOME VISIT (OUTPATIENT)
Dept: POST ACUTE CARE | Facility: EXTERNAL LOCATION | Age: 83
End: 2023-04-05
Payer: COMMERCIAL

## 2023-04-05 DIAGNOSIS — E11.69 TYPE 2 DIABETES MELLITUS WITH OTHER SPECIFIED COMPLICATION, WITH LONG-TERM CURRENT USE OF INSULIN (MULTI): ICD-10-CM

## 2023-04-05 DIAGNOSIS — G93.89 BRAIN MASS: ICD-10-CM

## 2023-04-05 DIAGNOSIS — Z79.4 TYPE 2 DIABETES MELLITUS WITH OTHER SPECIFIED COMPLICATION, WITH LONG-TERM CURRENT USE OF INSULIN (MULTI): ICD-10-CM

## 2023-04-05 DIAGNOSIS — I10 HYPERTENSION, ESSENTIAL: ICD-10-CM

## 2023-04-05 DIAGNOSIS — R53.1 WEAKNESS: Primary | ICD-10-CM

## 2023-04-05 PROCEDURE — 99308 SBSQ NF CARE LOW MDM 20: CPT | Performed by: NURSE PRACTITIONER

## 2023-04-05 NOTE — LETTER
Patient: Amber De La Rosa  : 1940    Encounter Date: 2023    PROGRESS NOTE    Subjective  Chief complaint: Amber De La Rosa is a 82 y.o. male who is a acute skilled care patient being seen and evaluated for weakness.    HPI:  3/28/2023 82 years old male with past medical history of brain tumor, polyp, GERD, hyperlipidemia, hypertension.  Patient admitted to skilled nursing facility for therapy due to weakness after recent hospitalization for new onset left-sided weakness.  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  CT chest with 12 mm pulmonary nodule in the left lower lobe and 6 mm nodule in the right middle lobe.  Patient was admitted to the hospital and seen by neurology and neurosurgery.  He underwent surgery on brain and is now admitted to skilled nursing facility for therapy.    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  He is able to walk up to 10 feet in the parallel bars with moderate to maximum assist with close wheelchair follow.  He requires moderate to maximum assist for sit to stand with pulling up on the parallel bars.  Speech therapy is working with him on safe swallowing and cognition.  Daughter at bedside and reviewed medication regimen.  No new concerns today.    3/30/2023 patient is at skilled nursing facility for therapy.  He had follow-up with specialist yesterday and returned with new orders for memantine.  Nurse reported that the patient's labs were obtained at appointment and showed sodium 124.  Patient was placed on a fluid restriction and sent back to nursing facility.  Family at bedside today to discuss plan of care.  Family states patient has been more sleepy than usual today although notes that he did not sleep well last night.  Patient is awake and talking/interacting in conversation.      3/31/23 Patient working in therapy. He is doing well and in good spirits. He is getting stronger. Patient  able to walk up to 10 feet in the parallel bars with  moderate to maximum assist with close wheelchair follow. No acute distress at this time.     4/3/23 Patient is at SNF for therapy.  He is able to walk short distances up to 10 feet in parallel bars with moderate to maximum assist.  He is also working with speech therapy for dysphagia.  Patient states he feels well and has no new concerns today.    4/4/23 Continues working in therapy due to weakness.   Patient requires moderate assistance for transfers ADLs and mobility at this time.  Denies constitutional complaints.  No acute distress.    4/5/23  Patient has been working in therapy to improve strength, endurance, and ADLs.  Patient continues to work toward goals.  No new concerns today.  Denies n/v/f/c pain.          Objective  Vital signs: 18, 121/52, 97.6, 66, 98%,   Physical Exam  Constitutional:       General: He is not in acute distress.  Eyes:      Extraocular Movements: Extraocular movements intact.   Cardiovascular:      Rate and Rhythm: Regular rhythm.   Pulmonary:      Effort: Pulmonary effort is normal.      Breath sounds: Normal breath sounds.   Abdominal:      General: Bowel sounds are normal.      Palpations: Abdomen is soft.   Musculoskeletal:      Cervical back: Neck supple.      Right lower leg: No edema.      Left lower leg: No edema.      Comments: Left-sided weakness   Neurological:      Mental Status: He is alert.   Psychiatric:         Mood and Affect: Mood normal.         Behavior: Behavior is cooperative.         Assessment/Plan  Problem List Items Addressed This Visit       Brain mass     Follow-up with specialist           DM (diabetes mellitus) (CMS/[facility])     FBG at goal  Monitor BS         Hypertension, essential     Controlled  Continue antihypertensives  Monitor blood pressure         Weakness - Primary     Continue therapy           Medications, treatments, and labs reviewed  Continue medications and treatments as listed in PCC    Babatundeibranjit Attestation  I, Tru Rodriguez    attest that this documentation has been prepared under the direction and in the presence of MICHEL Canales    Provider Attestation - Scribe documentation  All medical record entries made by the Scribe were at my direction and personally dictated by me. I have reviewed the chart and agree that the record accurately reflects my personal performance of the history, physical exam, discussion and plan.   MICHEL Canales        Electronically Signed By: MICHEL Canales   4/14/23  6:41 PM

## 2023-04-06 ENCOUNTER — NURSING HOME VISIT (OUTPATIENT)
Dept: POST ACUTE CARE | Facility: EXTERNAL LOCATION | Age: 83
End: 2023-04-06
Payer: COMMERCIAL

## 2023-04-06 DIAGNOSIS — R13.10 DYSPHAGIA, UNSPECIFIED TYPE: ICD-10-CM

## 2023-04-06 DIAGNOSIS — E11.69 TYPE 2 DIABETES MELLITUS WITH OTHER SPECIFIED COMPLICATION, WITH LONG-TERM CURRENT USE OF INSULIN (MULTI): ICD-10-CM

## 2023-04-06 DIAGNOSIS — G93.89 BRAIN MASS: ICD-10-CM

## 2023-04-06 DIAGNOSIS — I10 HYPERTENSION, ESSENTIAL: ICD-10-CM

## 2023-04-06 DIAGNOSIS — R53.1 WEAKNESS: Primary | ICD-10-CM

## 2023-04-06 DIAGNOSIS — Z79.4 TYPE 2 DIABETES MELLITUS WITH OTHER SPECIFIED COMPLICATION, WITH LONG-TERM CURRENT USE OF INSULIN (MULTI): ICD-10-CM

## 2023-04-06 PROCEDURE — 99308 SBSQ NF CARE LOW MDM 20: CPT | Performed by: NURSE PRACTITIONER

## 2023-04-06 NOTE — PROGRESS NOTES
PROGRESS NOTE    Subjective   Chief complaint: Amber De La Rosa is a 82 y.o. male who is a acute skilled care patient being seen and evaluated for weakness.    HPI:  3/28/2023 82 years old male with past medical history of brain tumor, polyp, GERD, hyperlipidemia, hypertension.  Patient admitted to skilled nursing facility for therapy due to weakness after recent hospitalization for new onset left-sided weakness.  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  CT chest with 12 mm pulmonary nodule in the left lower lobe and 6 mm nodule in the right middle lobe.  Patient was admitted to the hospital and seen by neurology and neurosurgery.  He underwent surgery on brain and is now admitted to skilled nursing facility for therapy.    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  He is able to walk up to 10 feet in the parallel bars with moderate to maximum assist with close wheelchair follow.  He requires moderate to maximum assist for sit to stand with pulling up on the parallel bars.  Speech therapy is working with him on safe swallowing and cognition.  Daughter at bedside and reviewed medication regimen.  No new concerns today.    3/30/2023 patient is at skilled nursing facility for therapy.  He had follow-up with specialist yesterday and returned with new orders for memantine.  Nurse reported that the patient's labs were obtained at appointment and showed sodium 124.  Patient was placed on a fluid restriction and sent back to nursing facility.  Family at bedside today to discuss plan of care.  Family states patient has been more sleepy than usual today although notes that he did not sleep well last night.  Patient is awake and talking/interacting in conversation.      3/31/23 Patient working in therapy. He is doing well and in good spirits. He is getting stronger. Patient  able to walk up to 10 feet in the parallel bars with moderate to maximum assist with close wheelchair follow. No acute  distress at this time.     4/3/23 Patient is at SNF for therapy.  He is able to walk short distances up to 10 feet in parallel bars with moderate to maximum assist.  He is also working with speech therapy for dysphagia.  Patient states he feels well and has no new concerns today.    4/4/23 Continues working in therapy due to weakness.   Patient requires moderate assistance for transfers ADLs and mobility at this time.  Denies constitutional complaints.  No acute distress.    4/5/23  Patient has been working in therapy to improve strength, endurance, and ADLs.  Patient continues to work toward goals.  No new concerns today.  Denies n/v/f/c pain.          Objective   Vital signs: 18, 121/52, 97.6, 66, 98%,   Physical Exam  Constitutional:       General: He is not in acute distress.  Eyes:      Extraocular Movements: Extraocular movements intact.   Cardiovascular:      Rate and Rhythm: Regular rhythm.   Pulmonary:      Effort: Pulmonary effort is normal.      Breath sounds: Normal breath sounds.   Abdominal:      General: Bowel sounds are normal.      Palpations: Abdomen is soft.   Musculoskeletal:      Cervical back: Neck supple.      Right lower leg: No edema.      Left lower leg: No edema.      Comments: Left-sided weakness   Neurological:      Mental Status: He is alert.   Psychiatric:         Mood and Affect: Mood normal.         Behavior: Behavior is cooperative.         Assessment/Plan   Problem List Items Addressed This Visit       Brain mass     Follow-up with specialist           DM (diabetes mellitus) (CMS/[facility])     FBG at goal  Monitor BS         Hypertension, essential     Controlled  Continue antihypertensives  Monitor blood pressure         Weakness - Primary     Continue therapy           Medications, treatments, and labs reviewed  Continue medications and treatments as listed in [facility]    Scribe Attestation  I, Tru Rodriguez   attest that this documentation has been prepared under the direction  and in the presence of MICHEL Canales    Provider Attestation - Scribe documentation  All medical record entries made by the Scribe were at my direction and personally dictated by me. I have reviewed the chart and agree that the record accurately reflects my personal performance of the history, physical exam, discussion and plan.   MICHEL Canales

## 2023-04-06 NOTE — LETTER
Patient: Amber De La Rosa  : 1940    Encounter Date: 2023    PROGRESS NOTE    Subjective  Chief complaint: Amber De La Rosa is a 82 y.o. male who is a acute skilled care patient being seen and evaluated for weakness.    HPI:  3/28/2023 82 years old male with past medical history of brain tumor, polyp, GERD, hyperlipidemia, hypertension.  Patient admitted to skilled nursing facility for therapy due to weakness after recent hospitalization for new onset left-sided weakness.  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  CT chest with 12 mm pulmonary nodule in the left lower lobe and 6 mm nodule in the right middle lobe.  Patient was admitted to the hospital and seen by neurology and neurosurgery.  He underwent surgery on brain and is now admitted to skilled nursing facility for therapy.    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  He is able to walk up to 10 feet in the parallel bars with moderate to maximum assist with close wheelchair follow.  He requires moderate to maximum assist for sit to stand with pulling up on the parallel bars.  Speech therapy is working with him on safe swallowing and cognition.  Daughter at bedside and reviewed medication regimen.  No new concerns today.    3/30/2023 patient is at skilled nursing facility for therapy.  He had follow-up with specialist yesterday and returned with new orders for memantine.  Nurse reported that the patient's labs were obtained at appointment and showed sodium 124.  Patient was placed on a fluid restriction and sent back to nursing facility.  Family at bedside today to discuss plan of care.  Family states patient has been more sleepy than usual today although notes that he did not sleep well last night.  Patient is awake and talking/interacting in conversation.      3/31/23 Patient working in therapy. He is doing well and in good spirits. He is getting stronger. Patient  able to walk up to 10 feet in the parallel bars with  moderate to maximum assist with close wheelchair follow. No acute distress at this time.     4/3/23 Patient is at SNF for therapy.  He is able to walk short distances up to 10 feet in parallel bars with moderate to maximum assist.  He is also working with speech therapy for dysphagia.  Patient states he feels well and has no new concerns today.    4/4/23 Continues working in therapy due to weakness.   Patient requires moderate assistance for transfers ADLs and mobility at this time.  Denies constitutional complaints.  No acute distress.    4/5/23  Patient has been working in therapy to improve strength, endurance, and ADLs.  Patient continues to work toward goals.  No new concerns today.  Denies n/v/f/c pain.      4/6/23  Therapy has been working with the patient to improve strength and endurance with ADLs, transfers, and mobility.  Patient continues to work toward goals.  Patient is stable and has no new complaints.  Nursing staff voices no new concerns today.        Objective  Vital signs:   18, 121/62, 97.6, 66, 98%,   Physical Exam  Constitutional:       General: He is not in acute distress.  Eyes:      Extraocular Movements: Extraocular movements intact.   Cardiovascular:      Rate and Rhythm: Regular rhythm.   Pulmonary:      Effort: Pulmonary effort is normal.      Breath sounds: Normal breath sounds.   Abdominal:      General: Bowel sounds are normal.      Palpations: Abdomen is soft.   Musculoskeletal:      Cervical back: Neck supple.      Right lower leg: No edema.      Left lower leg: No edema.      Comments: Left-sided weakness   Neurological:      Mental Status: He is alert.   Psychiatric:         Mood and Affect: Mood normal.         Behavior: Behavior is cooperative.         Assessment/Plan  Problem List Items Addressed This Visit       Brain mass     Follow-up with specialist           DM (diabetes mellitus) (CMS/[facility])     FBG at goal  Monitor BS         Dysphagia     Soft diet  ST          Hypertension, essential     Controlled  Continue antihypertensives  Monitor blood pressure         Weakness - Primary     Continue therapy           Medications, treatments, and labs reviewed  Continue medications and treatments as listed in [facility]    Scribe Attestation  IChristy Scribe   attest that this documentation has been prepared under the direction and in the presence of MICHEL Canales    Provider Attestation - Scribe documentation  All medical record entries made by the Scribe were at my direction and personally dictated by me. I have reviewed the chart and agree that the record accurately reflects my personal performance of the history, physical exam, discussion and plan.   MICHEL Canales        Electronically Signed By: MICHEL Canales   4/14/23  6:45 PM

## 2023-04-06 NOTE — PROGRESS NOTES
PROGRESS NOTE    Subjective   Chief complaint: Amber De La Rosa is a 82 y.o. male who is a acute skilled care patient being seen and evaluated for weakness.    HPI:  3/28/2023 82 years old male with past medical history of brain tumor, polyp, GERD, hyperlipidemia, hypertension.  Patient admitted to skilled nursing facility for therapy due to weakness after recent hospitalization for new onset left-sided weakness.  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  CT chest with 12 mm pulmonary nodule in the left lower lobe and 6 mm nodule in the right middle lobe.  Patient was admitted to the hospital and seen by neurology and neurosurgery.  He underwent surgery on brain and is now admitted to skilled nursing facility for therapy.    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  He is able to walk up to 10 feet in the parallel bars with moderate to maximum assist with close wheelchair follow.  He requires moderate to maximum assist for sit to stand with pulling up on the parallel bars.  Speech therapy is working with him on safe swallowing and cognition.  Daughter at bedside and reviewed medication regimen.  No new concerns today.    3/30/2023 patient is at skilled nursing facility for therapy.  He had follow-up with specialist yesterday and returned with new orders for memantine.  Nurse reported that the patient's labs were obtained at appointment and showed sodium 124.  Patient was placed on a fluid restriction and sent back to nursing facility.  Family at bedside today to discuss plan of care.  Family states patient has been more sleepy than usual today although notes that he did not sleep well last night.  Patient is awake and talking/interacting in conversation.      3/31/23 Patient working in therapy. He is doing well and in good spirits. He is getting stronger. Patient  able to walk up to 10 feet in the parallel bars with moderate to maximum assist with close wheelchair follow. No acute  distress at this time.     4/3/23 Patient is at SNF for therapy.  He is able to walk short distances up to 10 feet in parallel bars with moderate to maximum assist.  He is also working with speech therapy for dysphagia.  Patient states he feels well and has no new concerns today.    4/4/23 Continues working in therapy due to weakness.   Patient requires moderate assistance for transfers ADLs and mobility at this time.  Denies constitutional complaints.  No acute distress.    4/5/23  Patient has been working in therapy to improve strength, endurance, and ADLs.  Patient continues to work toward goals.  No new concerns today.  Denies n/v/f/c pain.      4/6/23  Therapy has been working with the patient to improve strength and endurance with ADLs, transfers, and mobility.  Patient continues to work toward goals.  Patient is stable and has no new complaints.  Nursing staff voices no new concerns today.        Objective   Vital signs:   18, 121/62, 97.6, 66, 98%,   Physical Exam  Constitutional:       General: He is not in acute distress.  Eyes:      Extraocular Movements: Extraocular movements intact.   Cardiovascular:      Rate and Rhythm: Regular rhythm.   Pulmonary:      Effort: Pulmonary effort is normal.      Breath sounds: Normal breath sounds.   Abdominal:      General: Bowel sounds are normal.      Palpations: Abdomen is soft.   Musculoskeletal:      Cervical back: Neck supple.      Right lower leg: No edema.      Left lower leg: No edema.      Comments: Left-sided weakness   Neurological:      Mental Status: He is alert.   Psychiatric:         Mood and Affect: Mood normal.         Behavior: Behavior is cooperative.         Assessment/Plan   Problem List Items Addressed This Visit       Brain mass     Follow-up with specialist           DM (diabetes mellitus) (CMS/[facility])     FBG at goal  Monitor BS         Dysphagia     Soft diet  ST         Hypertension, essential     Controlled  Continue  antihypertensives  Monitor blood pressure         Weakness - Primary     Continue therapy           Medications, treatments, and labs reviewed  Continue medications and treatments as listed in [facility]    Scribe Attestation  IChristy Scribe   attest that this documentation has been prepared under the direction and in the presence of MICHEL Canales    Provider Attestation - Scribe documentation  All medical record entries made by the Scribe were at my direction and personally dictated by me. I have reviewed the chart and agree that the record accurately reflects my personal performance of the history, physical exam, discussion and plan.   MICHEL Canales

## 2023-04-07 ENCOUNTER — NURSING HOME VISIT (OUTPATIENT)
Dept: POST ACUTE CARE | Facility: EXTERNAL LOCATION | Age: 83
End: 2023-04-07
Payer: COMMERCIAL

## 2023-04-07 DIAGNOSIS — E11.69 TYPE 2 DIABETES MELLITUS WITH OTHER SPECIFIED COMPLICATION, WITH LONG-TERM CURRENT USE OF INSULIN (MULTI): ICD-10-CM

## 2023-04-07 DIAGNOSIS — Z79.4 TYPE 2 DIABETES MELLITUS WITH OTHER SPECIFIED COMPLICATION, WITH LONG-TERM CURRENT USE OF INSULIN (MULTI): ICD-10-CM

## 2023-04-07 DIAGNOSIS — R53.1 WEAKNESS: ICD-10-CM

## 2023-04-07 PROCEDURE — 99309 SBSQ NF CARE MODERATE MDM 30: CPT | Performed by: INTERNAL MEDICINE

## 2023-04-07 NOTE — LETTER
Patient: Amber De La Rosa  : 1940    Encounter Date: 2023    PROGRESS NOTE    Subjective  Chief complaint: Amber De La Rosa is a 82 y.o. male who is an acute skilled patient being seen and evaluated for weakness    HPI:  3/28/2023 82 years old male with past medical history of brain tumor, polyp, GERD, hyperlipidemia, hypertension.  Patient admitted to skilled nursing facility for therapy due to weakness after recent hospitalization for new onset left-sided weakness.  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  CT chest with 12 mm pulmonary nodule in the left lower lobe and 6 mm nodule in the right middle lobe.  Patient was admitted to the hospital and seen by neurology and neurosurgery.  He underwent surgery on brain and is now admitted to skilled nursing facility for therapy.    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  He is able to walk up to 10 feet in the parallel bars with moderate to maximum assist with close wheelchair follow.  He requires moderate to maximum assist for sit to stand with pulling up on the parallel bars.  Speech therapy is working with him on safe swallowing and cognition.  Daughter at bedside and reviewed medication regimen.  No new concerns today.    3/30/2023 patient is at skilled nursing facility for therapy.  He had follow-up with specialist yesterday and returned with new orders for memantine.  Nurse reported that the patient's labs were obtained at appointment and showed sodium 124.  Patient was placed on a fluid restriction and sent back to nursing facility.  Family at bedside today to discuss plan of care.  Family states patient has been more sleepy than usual today although notes that he did not sleep well last night.  Patient is awake and talking/interacting in conversation.      3/31/23 Patient working in therapy. He is doing well and in good spirits. He is getting stronger. Patient  able to walk up to 10 feet in the parallel bars with  moderate to maximum assist with close wheelchair follow. No acute distress at this time.     4/3/23 Patient is at SNF for therapy.  He is able to walk short distances up to 10 feet in parallel bars with moderate to maximum assist.  He is also working with speech therapy for dysphagia.  Patient states he feels well and has no new concerns today.    4/4/23 Continues working in therapy due to weakness.   Patient requires moderate assistance for transfers ADLs and mobility at this time.  Denies constitutional complaints.  No acute distress.    4/5/23  Patient has been working in therapy to improve strength, endurance, and ADLs.  Patient continues to work toward goals.  No new concerns today.  Denies n/v/f/c pain.      4/6/23  Therapy has been working with the patient to improve strength and endurance with ADLs, transfers, and mobility.  Patient continues to work toward goals.  Patient is stable and has no new complaints.  Nursing staff voices no new concerns today.    4/7/23  patient working in therapy due to weakness and debility.  Does not ambulate, requires moderate assistance for transfers. No acute distress or new concerns today.       Objective  Vital signs: 149/80, 55, 16, 98%     Physical Exam  Constitutional:       General: He is not in acute distress.  Eyes:      Extraocular Movements: Extraocular movements intact.   Cardiovascular:      Rate and Rhythm: Normal rate and regular rhythm.   Pulmonary:      Effort: Pulmonary effort is normal.      Breath sounds: Normal breath sounds.   Abdominal:      General: Bowel sounds are normal.      Palpations: Abdomen is soft.   Musculoskeletal:         General: Normal range of motion.      Cervical back: Normal range of motion and neck supple.      Right lower leg: No edema.      Left lower leg: No edema.   Neurological:      Mental Status: He is alert.   Psychiatric:         Mood and Affect: Mood normal.         Behavior: Behavior is cooperative.          Assessment/Plan  Problem List Items Addressed This Visit          Endocrine/Metabolic    DM (diabetes mellitus) (CMS/[facility])     Monitor BS  Continue SSI            Other    Weakness     Continue therapy           Medications, treatments, and labs reviewed  Continue medications and treatments as listed in [facility]    Scribe Attestation  By signing my name below, I, Nyla Angelo, Babatundeibe   attest that this documentation has been prepared under the direction and in the presence of Dasia Barton MD.    Provider Attestation - Scribe documentation  All medical record entries made by the Scribe were at my direction and personally dictated by me. I have reviewed the chart and agree that the record accurately reflects my personal performance of the history, physical exam, discussion and plan.      Electronically Signed By: Dasia Barton MD   4/10/23  7:31 PM

## 2023-04-10 ENCOUNTER — NURSING HOME VISIT (OUTPATIENT)
Dept: POST ACUTE CARE | Facility: EXTERNAL LOCATION | Age: 83
End: 2023-04-10
Payer: COMMERCIAL

## 2023-04-10 DIAGNOSIS — I10 HYPERTENSION, ESSENTIAL: ICD-10-CM

## 2023-04-10 DIAGNOSIS — R53.1 WEAKNESS: Primary | ICD-10-CM

## 2023-04-10 DIAGNOSIS — G93.89 BRAIN MASS: ICD-10-CM

## 2023-04-10 DIAGNOSIS — R13.10 DYSPHAGIA, UNSPECIFIED TYPE: ICD-10-CM

## 2023-04-10 PROCEDURE — 99308 SBSQ NF CARE LOW MDM 20: CPT | Performed by: NURSE PRACTITIONER

## 2023-04-10 NOTE — PROGRESS NOTES
PROGRESS NOTE    Subjective   Chief complaint: Amber De La Rosa is a 82 y.o. male who is an acute skilled patient being seen and evaluated for weakness    HPI:  3/28/2023 82 years old male with past medical history of brain tumor, polyp, GERD, hyperlipidemia, hypertension.  Patient admitted to skilled nursing facility for therapy due to weakness after recent hospitalization for new onset left-sided weakness.  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  CT chest with 12 mm pulmonary nodule in the left lower lobe and 6 mm nodule in the right middle lobe.  Patient was admitted to the hospital and seen by neurology and neurosurgery.  He underwent surgery on brain and is now admitted to skilled nursing facility for therapy.    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  He is able to walk up to 10 feet in the parallel bars with moderate to maximum assist with close wheelchair follow.  He requires moderate to maximum assist for sit to stand with pulling up on the parallel bars.  Speech therapy is working with him on safe swallowing and cognition.  Daughter at bedside and reviewed medication regimen.  No new concerns today.    3/30/2023 patient is at skilled nursing facility for therapy.  He had follow-up with specialist yesterday and returned with new orders for memantine.  Nurse reported that the patient's labs were obtained at appointment and showed sodium 124.  Patient was placed on a fluid restriction and sent back to nursing facility.  Family at bedside today to discuss plan of care.  Family states patient has been more sleepy than usual today although notes that he did not sleep well last night.  Patient is awake and talking/interacting in conversation.      3/31/23 Patient working in therapy. He is doing well and in good spirits. He is getting stronger. Patient  able to walk up to 10 feet in the parallel bars with moderate to maximum assist with close wheelchair follow. No acute  distress at this time.     4/3/23 Patient is at SNF for therapy.  He is able to walk short distances up to 10 feet in parallel bars with moderate to maximum assist.  He is also working with speech therapy for dysphagia.  Patient states he feels well and has no new concerns today.    4/4/23 Continues working in therapy due to weakness.   Patient requires moderate assistance for transfers ADLs and mobility at this time.  Denies constitutional complaints.  No acute distress.    4/5/23  Patient has been working in therapy to improve strength, endurance, and ADLs.  Patient continues to work toward goals.  No new concerns today.  Denies n/v/f/c pain.      4/6/23  Therapy has been working with the patient to improve strength and endurance with ADLs, transfers, and mobility.  Patient continues to work toward goals.  Patient is stable and has no new complaints.  Nursing staff voices no new concerns today.    4/7/23  patient working in therapy due to weakness and debility.  Does not ambulate, requires moderate assistance for transfers. No acute distress or new concerns today.       Objective   Vital signs: 149/80, 55, 16, 98%     Physical Exam  Constitutional:       General: He is not in acute distress.  Eyes:      Extraocular Movements: Extraocular movements intact.   Cardiovascular:      Rate and Rhythm: Normal rate and regular rhythm.   Pulmonary:      Effort: Pulmonary effort is normal.      Breath sounds: Normal breath sounds.   Abdominal:      General: Bowel sounds are normal.      Palpations: Abdomen is soft.   Musculoskeletal:         General: Normal range of motion.      Cervical back: Normal range of motion and neck supple.      Right lower leg: No edema.      Left lower leg: No edema.   Neurological:      Mental Status: He is alert.   Psychiatric:         Mood and Affect: Mood normal.         Behavior: Behavior is cooperative.         Assessment/Plan   Problem List Items Addressed This Visit           Endocrine/Metabolic    DM (diabetes mellitus) (CMS/[facility])     Monitor BS  Continue SSI            Other    Weakness     Continue therapy           Medications, treatments, and labs reviewed  Continue medications and treatments as listed in [facility]    Scribe Attestation  By signing my name below, I, Tru Brandt   attest that this documentation has been prepared under the direction and in the presence of Dasia Barton MD.    Provider Attestation - Scribe documentation  All medical record entries made by the Scribe were at my direction and personally dictated by me. I have reviewed the chart and agree that the record accurately reflects my personal performance of the history, physical exam, discussion and plan.

## 2023-04-10 NOTE — LETTER
Patient: Amber De La Rosa  : 1940    Encounter Date: 04/10/2023    PROGRESS NOTE    Subjective  Chief complaint: Amber De La Rosa is a 82 y.o. male who is an acute skilled patient being seen and evaluated for weakness    HPI:  3/28/2023 82 years old male with past medical history of brain tumor, polyp, GERD, hyperlipidemia, hypertension.  Patient admitted to skilled nursing facility for therapy due to weakness after recent hospitalization for new onset left-sided weakness.  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  CT chest with 12 mm pulmonary nodule in the left lower lobe and 6 mm nodule in the right middle lobe.  Patient was admitted to the hospital and seen by neurology and neurosurgery.  He underwent surgery on brain and is now admitted to skilled nursing facility for therapy.    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  He is able to walk up to 10 feet in the parallel bars with moderate to maximum assist with close wheelchair follow.  He requires moderate to maximum assist for sit to stand with pulling up on the parallel bars.  Speech therapy is working with him on safe swallowing and cognition.  Daughter at bedside and reviewed medication regimen.  No new concerns today.    3/30/2023 patient is at skilled nursing facility for therapy.  He had follow-up with specialist yesterday and returned with new orders for memantine.  Nurse reported that the patient's labs were obtained at appointment and showed sodium 124.  Patient was placed on a fluid restriction and sent back to nursing facility.  Family at bedside today to discuss plan of care.  Family states patient has been more sleepy than usual today although notes that he did not sleep well last night.  Patient is awake and talking/interacting in conversation.      3/31/23 Patient working in therapy. He is doing well and in good spirits. He is getting stronger. Patient  able to walk up to 10 feet in the parallel bars with  moderate to maximum assist with close wheelchair follow. No acute distress at this time.     4/3/23 Patient is at SNF for therapy.  He is able to walk short distances up to 10 feet in parallel bars with moderate to maximum assist.  He is also working with speech therapy for dysphagia.  Patient states he feels well and has no new concerns today.    4/4/23 Continues working in therapy due to weakness.   Patient requires moderate assistance for transfers ADLs and mobility at this time.  Denies constitutional complaints.  No acute distress.    4/5/23  Patient has been working in therapy to improve strength, endurance, and ADLs.  Patient continues to work toward goals.  No new concerns today.  Denies n/v/f/c pain.      4/6/23  Therapy has been working with the patient to improve strength and endurance with ADLs, transfers, and mobility.  Patient continues to work toward goals.  Patient is stable and has no new complaints.  Nursing staff voices no new concerns today.    4/7/23  patient working in therapy due to weakness and debility.  Does not ambulate, requires moderate assistance for transfers. No acute distress or new concerns today.     4/10/2023 patient working in PT OT and ST.  He is able to walk 25 feet with use of haney rail with moderate to maximum assist.  He is working on gross motor coordination and weight shifting to improve safety with unsupported sit to stand.  Patient requires maximum assist for pivots.  He has no new concerns today.  Denies constitutional symptoms.      Objective  Vital signs: 149/80    Physical Exam  Constitutional:       General: He is not in acute distress.  Eyes:      Extraocular Movements: Extraocular movements intact.   Cardiovascular:      Rate and Rhythm: Regular rhythm.   Pulmonary:      Effort: Pulmonary effort is normal.      Breath sounds: Normal breath sounds.   Abdominal:      General: Bowel sounds are normal.      Palpations: Abdomen is soft.   Musculoskeletal:      Cervical  back: Neck supple.      Right lower leg: No edema.      Left lower leg: No edema.      Comments: Left-sided weakness   Neurological:      Mental Status: He is alert.   Psychiatric:         Mood and Affect: Mood normal.         Behavior: Behavior is cooperative.         Assessment/Plan  Problem List Items Addressed This Visit       Brain mass     Follow-up with specialist           Dysphagia     Soft diet  ST         Hypertension, essential     Continue antihypertensives  Monitor blood pressure         Weakness - Primary     Continue therapy           Medications, treatments, and labs reviewed  Continue medications and treatments as listed in [facility]    MICHEL Canaels      Electronically Signed By: MICHEL Canales   4/10/23  7:34 PM

## 2023-04-11 ENCOUNTER — NURSING HOME VISIT (OUTPATIENT)
Dept: POST ACUTE CARE | Facility: EXTERNAL LOCATION | Age: 83
End: 2023-04-11
Payer: COMMERCIAL

## 2023-04-11 DIAGNOSIS — Z79.4 TYPE 2 DIABETES MELLITUS WITH OTHER SPECIFIED COMPLICATION, WITH LONG-TERM CURRENT USE OF INSULIN (MULTI): ICD-10-CM

## 2023-04-11 DIAGNOSIS — R53.1 WEAKNESS: ICD-10-CM

## 2023-04-11 DIAGNOSIS — E11.69 TYPE 2 DIABETES MELLITUS WITH OTHER SPECIFIED COMPLICATION, WITH LONG-TERM CURRENT USE OF INSULIN (MULTI): ICD-10-CM

## 2023-04-11 DIAGNOSIS — I10 HYPERTENSION, ESSENTIAL: ICD-10-CM

## 2023-04-11 PROCEDURE — 99309 SBSQ NF CARE MODERATE MDM 30: CPT | Performed by: INTERNAL MEDICINE

## 2023-04-11 NOTE — LETTER
Patient: Amber De La Rosa  : 1940    Encounter Date: 2023    PROGRESS NOTE    Subjective  Chief complaint: Amber De La Rosa is a 82 y.o. male who is an acute skilled patient being seen and evaluated for weakness    HPI:  3/28/2023 82 years old male with past medical history of brain tumor, polyp, GERD, hyperlipidemia, hypertension.  Patient admitted to skilled nursing facility for therapy due to weakness after recent hospitalization for new onset left-sided weakness.  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  CT chest with 12 mm pulmonary nodule in the left lower lobe and 6 mm nodule in the right middle lobe.  Patient was admitted to the hospital and seen by neurology and neurosurgery.  He underwent surgery on brain and is now admitted to skilled nursing facility for therapy.    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  He is able to walk up to 10 feet in the parallel bars with moderate to maximum assist with close wheelchair follow.  He requires moderate to maximum assist for sit to stand with pulling up on the parallel bars.  Speech therapy is working with him on safe swallowing and cognition.  Daughter at bedside and reviewed medication regimen.  No new concerns today.    3/30/2023 patient is at skilled nursing facility for therapy.  He had follow-up with specialist yesterday and returned with new orders for memantine.  Nurse reported that the patient's labs were obtained at appointment and showed sodium 124.  Patient was placed on a fluid restriction and sent back to nursing facility.  Family at bedside today to discuss plan of care.  Family states patient has been more sleepy than usual today although notes that he did not sleep well last night.  Patient is awake and talking/interacting in conversation.      3/31/23 Patient working in therapy. He is doing well and in good spirits. He is getting stronger. Patient  able to walk up to 10 feet in the parallel bars with  moderate to maximum assist with close wheelchair follow. No acute distress at this time.     4/3/23 Patient is at SNF for therapy.  He is able to walk short distances up to 10 feet in parallel bars with moderate to maximum assist.  He is also working with speech therapy for dysphagia.  Patient states he feels well and has no new concerns today.    4/4/23 Continues working in therapy due to weakness.   Patient requires moderate assistance for transfers ADLs and mobility at this time.  Denies constitutional complaints.  No acute distress.    4/5/23  Patient has been working in therapy to improve strength, endurance, and ADLs.  Patient continues to work toward goals.  No new concerns today.  Denies n/v/f/c pain.      4/6/23  Therapy has been working with the patient to improve strength and endurance with ADLs, transfers, and mobility.  Patient continues to work toward goals.  Patient is stable and has no new complaints.  Nursing staff voices no new concerns today.    4/7/23  patient working in therapy due to weakness and debility.  Does not ambulate, requires moderate assistance for transfers. No acute distress or new concerns today.     4/10/2023 patient working in PT OT and ST.  He is able to walk 25 feet with use of haney rail with moderate to maximum assist.  He is working on gross motor coordination and weight shifting to improve safety with unsupported sit to stand.  Patient requires maximum assist for pivots.  He has no new concerns today.  Denies constitutional symptoms.    4/11/23 Patient is doing well and has no new complaints at this time. He is working with therapy and requires moderate to maximum assistance for all transfers. No acute distress.       Objective  Vital signs: 127/63, 65, 18, 96%     Physical Exam  Constitutional:       General: He is not in acute distress.  Eyes:      Extraocular Movements: Extraocular movements intact.   Cardiovascular:      Rate and Rhythm: Normal rate and regular  rhythm.      Pulses: Normal pulses.      Heart sounds: Normal heart sounds.   Pulmonary:      Effort: Pulmonary effort is normal.      Breath sounds: Normal breath sounds.   Abdominal:      General: Bowel sounds are normal.      Palpations: Abdomen is soft.   Musculoskeletal:      Cervical back: Normal range of motion and neck supple.      Right lower leg: Edema present.      Left lower leg: Edema present.   Neurological:      Mental Status: He is alert.   Psychiatric:         Mood and Affect: Mood normal.         Behavior: Behavior is cooperative.         Assessment/Plan  Problem List Items Addressed This Visit          Circulatory    Hypertension, essential     Continue antihypertensives  Monitor blood pressure            Endocrine/Metabolic    DM (diabetes mellitus) (CMS/[facility])     FBG at goal  Monitor BS            Other    Weakness     Continue therapy           Medications, treatments, and labs reviewed  Continue medications and treatments as listed in [facility]    Scribe Attestation  By signing my name below, I, Nyla Stephens Scribe   attest that this documentation has been prepared under the direction and in the presence of Dasia Barton MD.    Provider Attestation - Scribe documentation  All medical record entries made by the Scribe were at my direction and personally dictated by me. I have reviewed the chart and agree that the record accurately reflects my personal performance of the history, physical exam, discussion and plan.      Electronically Signed By: Dasia Barton MD   4/14/23  5:35 PM

## 2023-04-12 ENCOUNTER — NURSING HOME VISIT (OUTPATIENT)
Dept: POST ACUTE CARE | Facility: EXTERNAL LOCATION | Age: 83
End: 2023-04-12
Payer: COMMERCIAL

## 2023-04-12 DIAGNOSIS — R53.1 WEAKNESS: Primary | ICD-10-CM

## 2023-04-12 DIAGNOSIS — I10 HYPERTENSION, ESSENTIAL: ICD-10-CM

## 2023-04-12 DIAGNOSIS — G93.89 BRAIN MASS: ICD-10-CM

## 2023-04-12 DIAGNOSIS — E11.69 TYPE 2 DIABETES MELLITUS WITH OTHER SPECIFIED COMPLICATION, WITH LONG-TERM CURRENT USE OF INSULIN (MULTI): ICD-10-CM

## 2023-04-12 DIAGNOSIS — Z79.4 TYPE 2 DIABETES MELLITUS WITH OTHER SPECIFIED COMPLICATION, WITH LONG-TERM CURRENT USE OF INSULIN (MULTI): ICD-10-CM

## 2023-04-12 DIAGNOSIS — M54.50 LOW BACK PAIN, UNSPECIFIED BACK PAIN LATERALITY, UNSPECIFIED CHRONICITY, UNSPECIFIED WHETHER SCIATICA PRESENT: ICD-10-CM

## 2023-04-12 PROCEDURE — 99309 SBSQ NF CARE MODERATE MDM 30: CPT | Performed by: NURSE PRACTITIONER

## 2023-04-12 NOTE — PROGRESS NOTES
PROGRESS NOTE    Subjective   Chief complaint: Amber De La Rosa is a 82 y.o. male who is an acute skilled patient being seen and evaluated for weakness    HPI:  3/28/2023 82 years old male with past medical history of brain tumor, polyp, GERD, hyperlipidemia, hypertension.  Patient admitted to skilled nursing facility for therapy due to weakness after recent hospitalization for new onset left-sided weakness.  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  CT chest with 12 mm pulmonary nodule in the left lower lobe and 6 mm nodule in the right middle lobe.  Patient was admitted to the hospital and seen by neurology and neurosurgery.  He underwent surgery on brain and is now admitted to skilled nursing facility for therapy.    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  He is able to walk up to 10 feet in the parallel bars with moderate to maximum assist with close wheelchair follow.  He requires moderate to maximum assist for sit to stand with pulling up on the parallel bars.  Speech therapy is working with him on safe swallowing and cognition.  Daughter at bedside and reviewed medication regimen.  No new concerns today.    3/30/2023 patient is at skilled nursing facility for therapy.  He had follow-up with specialist yesterday and returned with new orders for memantine.  Nurse reported that the patient's labs were obtained at appointment and showed sodium 124.  Patient was placed on a fluid restriction and sent back to nursing facility.  Family at bedside today to discuss plan of care.  Family states patient has been more sleepy than usual today although notes that he did not sleep well last night.  Patient is awake and talking/interacting in conversation.      3/31/23 Patient working in therapy. He is doing well and in good spirits. He is getting stronger. Patient  able to walk up to 10 feet in the parallel bars with moderate to maximum assist with close wheelchair follow. No acute  distress at this time.     4/3/23 Patient is at SNF for therapy.  He is able to walk short distances up to 10 feet in parallel bars with moderate to maximum assist.  He is also working with speech therapy for dysphagia.  Patient states he feels well and has no new concerns today.    4/4/23 Continues working in therapy due to weakness.   Patient requires moderate assistance for transfers ADLs and mobility at this time.  Denies constitutional complaints.  No acute distress.    4/5/23  Patient has been working in therapy to improve strength, endurance, and ADLs.  Patient continues to work toward goals.  No new concerns today.  Denies n/v/f/c pain.      4/6/23  Therapy has been working with the patient to improve strength and endurance with ADLs, transfers, and mobility.  Patient continues to work toward goals.  Patient is stable and has no new complaints.  Nursing staff voices no new concerns today.    4/7/23  patient working in therapy due to weakness and debility.  Does not ambulate, requires moderate assistance for transfers. No acute distress or new concerns today.     4/10/2023 patient working in PT OT and ST.  He is able to walk 25 feet with use of haney rail with moderate to maximum assist.  He is working on gross motor coordination and weight shifting to improve safety with unsupported sit to stand.  Patient requires maximum assist for pivots.  He has no new concerns today.  Denies constitutional symptoms.    4/12/2023 patient continues to work towards goals in therapy.  Today he has complaint of low back pain aching type pain.  Otherwise feels okay.  Family at bedside and states that patient had virtual visit with neurologist yesterday who would like to make adjustments to his seizure medications and will be faxing orders to the nursing facility.      Objective   Vital signs: 149/80, blood sugar 106    Physical Exam  Constitutional:       General: He is not in acute distress.  Eyes:      Extraocular Movements:  Extraocular movements intact.   Cardiovascular:      Rate and Rhythm: Regular rhythm.   Pulmonary:      Effort: Pulmonary effort is normal.      Breath sounds: Normal breath sounds.   Abdominal:      General: Bowel sounds are normal.      Palpations: Abdomen is soft.   Musculoskeletal:      Cervical back: Neck supple.      Right lower leg: No edema.      Left lower leg: No edema.      Comments: Left-sided weakness  Tenderness over the lower mid back   Neurological:      Mental Status: He is alert.   Psychiatric:         Mood and Affect: Mood normal.         Behavior: Behavior is cooperative.         Assessment/Plan   Problem List Items Addressed This Visit       Brain mass     Follow-up with specialist           DM (diabetes mellitus) (CMS/[facility])     FBG at goal  Monitor BS         Hypertension, essential     Continue antihypertensives  Monitor blood pressure         Low back pain     Start Voltaren gel         Weakness - Primary     Continue therapy           Medications, treatments, and labs reviewed  Continue medications and treatments as listed in [facility]  Spoke with family and can nurse and discussed plan of care  KTETY Canales-CNP

## 2023-04-12 NOTE — LETTER
Patient: Amber De La Rosa  : 1940    Encounter Date: 2023    PROGRESS NOTE    Subjective  Chief complaint: Amber De La Rosa is a 82 y.o. male who is an acute skilled patient being seen and evaluated for weakness    HPI:  3/28/2023 82 years old male with past medical history of brain tumor, polyp, GERD, hyperlipidemia, hypertension.  Patient admitted to skilled nursing facility for therapy due to weakness after recent hospitalization for new onset left-sided weakness.  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  CT chest with 12 mm pulmonary nodule in the left lower lobe and 6 mm nodule in the right middle lobe.  Patient was admitted to the hospital and seen by neurology and neurosurgery.  He underwent surgery on brain and is now admitted to skilled nursing facility for therapy.    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  He is able to walk up to 10 feet in the parallel bars with moderate to maximum assist with close wheelchair follow.  He requires moderate to maximum assist for sit to stand with pulling up on the parallel bars.  Speech therapy is working with him on safe swallowing and cognition.  Daughter at bedside and reviewed medication regimen.  No new concerns today.    3/30/2023 patient is at skilled nursing facility for therapy.  He had follow-up with specialist yesterday and returned with new orders for memantine.  Nurse reported that the patient's labs were obtained at appointment and showed sodium 124.  Patient was placed on a fluid restriction and sent back to nursing facility.  Family at bedside today to discuss plan of care.  Family states patient has been more sleepy than usual today although notes that he did not sleep well last night.  Patient is awake and talking/interacting in conversation.      3/31/23 Patient working in therapy. He is doing well and in good spirits. He is getting stronger. Patient  able to walk up to 10 feet in the parallel bars with  moderate to maximum assist with close wheelchair follow. No acute distress at this time.     4/3/23 Patient is at SNF for therapy.  He is able to walk short distances up to 10 feet in parallel bars with moderate to maximum assist.  He is also working with speech therapy for dysphagia.  Patient states he feels well and has no new concerns today.    4/4/23 Continues working in therapy due to weakness.   Patient requires moderate assistance for transfers ADLs and mobility at this time.  Denies constitutional complaints.  No acute distress.    4/5/23  Patient has been working in therapy to improve strength, endurance, and ADLs.  Patient continues to work toward goals.  No new concerns today.  Denies n/v/f/c pain.      4/6/23  Therapy has been working with the patient to improve strength and endurance with ADLs, transfers, and mobility.  Patient continues to work toward goals.  Patient is stable and has no new complaints.  Nursing staff voices no new concerns today.    4/7/23  patient working in therapy due to weakness and debility.  Does not ambulate, requires moderate assistance for transfers. No acute distress or new concerns today.     4/10/2023 patient working in PT OT and ST.  He is able to walk 25 feet with use of haney rail with moderate to maximum assist.  He is working on gross motor coordination and weight shifting to improve safety with unsupported sit to stand.  Patient requires maximum assist for pivots.  He has no new concerns today.  Denies constitutional symptoms.    4/12/2023 patient continues to work towards goals in therapy.  Today he has complaint of low back pain aching type pain.  Otherwise feels okay.  Family at bedside and states that patient had virtual visit with neurologist yesterday who would like to make adjustments to his seizure medications and will be faxing orders to the nursing facility.      Objective  Vital signs: 149/80, blood sugar 106    Physical Exam  Constitutional:       General:  He is not in acute distress.  Eyes:      Extraocular Movements: Extraocular movements intact.   Cardiovascular:      Rate and Rhythm: Regular rhythm.   Pulmonary:      Effort: Pulmonary effort is normal.      Breath sounds: Normal breath sounds.   Abdominal:      General: Bowel sounds are normal.      Palpations: Abdomen is soft.   Musculoskeletal:      Cervical back: Neck supple.      Right lower leg: No edema.      Left lower leg: No edema.      Comments: Left-sided weakness  Tenderness over the lower mid back   Neurological:      Mental Status: He is alert.   Psychiatric:         Mood and Affect: Mood normal.         Behavior: Behavior is cooperative.         Assessment/Plan  Problem List Items Addressed This Visit       Brain mass     Follow-up with specialist           DM (diabetes mellitus) (CMS/[facility])     FBG at goal  Monitor BS         Hypertension, essential     Continue antihypertensives  Monitor blood pressure         Low back pain     Start Voltaren gel         Weakness - Primary     Continue therapy           Medications, treatments, and labs reviewed  Continue medications and treatments as listed in [facility]  Spoke with family and can nurse and discussed plan of care  MICHEL Canales      Electronically Signed By: MICHEL Canales   4/12/23  4:34 PM

## 2023-04-13 ENCOUNTER — NURSING HOME VISIT (OUTPATIENT)
Dept: POST ACUTE CARE | Facility: EXTERNAL LOCATION | Age: 83
End: 2023-04-13
Payer: COMMERCIAL

## 2023-04-13 DIAGNOSIS — Z79.4 TYPE 2 DIABETES MELLITUS WITH OTHER SPECIFIED COMPLICATION, WITH LONG-TERM CURRENT USE OF INSULIN (MULTI): ICD-10-CM

## 2023-04-13 DIAGNOSIS — R53.1 WEAKNESS: Primary | ICD-10-CM

## 2023-04-13 DIAGNOSIS — I10 HYPERTENSION, ESSENTIAL: ICD-10-CM

## 2023-04-13 DIAGNOSIS — G93.89 BRAIN MASS: ICD-10-CM

## 2023-04-13 DIAGNOSIS — E11.69 TYPE 2 DIABETES MELLITUS WITH OTHER SPECIFIED COMPLICATION, WITH LONG-TERM CURRENT USE OF INSULIN (MULTI): ICD-10-CM

## 2023-04-13 DIAGNOSIS — R13.10 DYSPHAGIA, UNSPECIFIED TYPE: ICD-10-CM

## 2023-04-13 PROCEDURE — 99309 SBSQ NF CARE MODERATE MDM 30: CPT | Performed by: NURSE PRACTITIONER

## 2023-04-13 NOTE — PROGRESS NOTES
PROGRESS NOTE    Subjective   Chief complaint: Amber De La Rosa is a 83 y.o. male who is a acute skilled care patient being seen and evaluated for weakness.    HPI:  3/28/2023 82 years old male with past medical history of brain tumor, polyp, GERD, hyperlipidemia, hypertension.  Patient admitted to skilled nursing facility for therapy due to weakness after recent hospitalization for new onset left-sided weakness.  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  CT chest with 12 mm pulmonary nodule in the left lower lobe and 6 mm nodule in the right middle lobe.  Patient was admitted to the hospital and seen by neurology and neurosurgery.  He underwent surgery on brain and is now admitted to skilled nursing facility for therapy.    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  He is able to walk up to 10 feet in the parallel bars with moderate to maximum assist with close wheelchair follow.  He requires moderate to maximum assist for sit to stand with pulling up on the parallel bars.  Speech therapy is working with him on safe swallowing and cognition.  Daughter at bedside and reviewed medication regimen.  No new concerns today.    3/30/2023 patient is at skilled nursing facility for therapy.  He had follow-up with specialist yesterday and returned with new orders for memantine.  Nurse reported that the patient's labs were obtained at appointment and showed sodium 124.  Patient was placed on a fluid restriction and sent back to nursing facility.  Family at bedside today to discuss plan of care.  Family states patient has been more sleepy than usual today although notes that he did not sleep well last night.  Patient is awake and talking/interacting in conversation.      3/31/23 Patient working in therapy. He is doing well and in good spirits. He is getting stronger. Patient  able to walk up to 10 feet in the parallel bars with moderate to maximum assist with close wheelchair follow. No acute  distress at this time.     4/3/23 Patient is at SNF for therapy.  He is able to walk short distances up to 10 feet in parallel bars with moderate to maximum assist.  He is also working with speech therapy for dysphagia.  Patient states he feels well and has no new concerns today.    4/4/23 Continues working in therapy due to weakness.   Patient requires moderate assistance for transfers ADLs and mobility at this time.  Denies constitutional complaints.  No acute distress.    4/5/23  Patient has been working in therapy to improve strength, endurance, and ADLs.  Patient continues to work toward goals.  No new concerns today.  Denies n/v/f/c pain.      4/6/23  Therapy has been working with the patient to improve strength and endurance with ADLs, transfers, and mobility.  Patient continues to work toward goals.  Patient is stable and has no new complaints.  Nursing staff voices no new concerns today.    4/7/23  patient working in therapy due to weakness and debility.  Does not ambulate, requires moderate assistance for transfers. No acute distress or new concerns today.     4/10/2023 patient working in PT OT and ST.  He is able to walk 25 feet with use of haney rail with moderate to maximum assist.  He is working on gross motor coordination and weight shifting to improve safety with unsupported sit to stand.  Patient requires maximum assist for pivots.  He has no new concerns today.  Denies constitutional symptoms.    4/12/2023 patient continues to work towards goals in therapy.  Today he has complaint of low back pain aching type pain.  Otherwise feels okay.  Family at bedside and states that patient had virtual visit with neurologist yesterday who would like to make adjustments to his seizure medications and will be faxing orders to the nursing facility.    4/13/23  Patient working on transfer training today.  Transferred sit to  // bars with mod assist x 2.  Also worked on standing activities in // bars.   Patient able to stand briefly 5-20 seconds x 4 trials with mod A x 2 poor+ balance.  Per therapist, patient was limited due to complaint of being tired.      Objective   Vital signs:   133/65, 96%,   Physical Exam  Constitutional:       General: He is not in acute distress.  Eyes:      Extraocular Movements: Extraocular movements intact.   Cardiovascular:      Rate and Rhythm: Regular rhythm.   Pulmonary:      Effort: Pulmonary effort is normal.      Breath sounds: Normal breath sounds.   Abdominal:      General: Bowel sounds are normal.      Palpations: Abdomen is soft.   Musculoskeletal:      Cervical back: Neck supple.      Right lower leg: No edema.      Left lower leg: No edema.      Comments: Left-sided weakness   Neurological:      Mental Status: He is alert.   Psychiatric:         Mood and Affect: Mood normal.         Behavior: Behavior is cooperative.         Assessment/Plan   Problem List Items Addressed This Visit       Brain mass     Follow-up with specialist           DM (diabetes mellitus) (CMS/[facility])     FBG at goal  Monitor BS         Dysphagia     Soft diet  Continue ST         Hypertension, essential     BP at goal  Continue antihypertensives  Monitor blood pressure         Weakness - Primary     Continue with therapy           Medications, treatments, and labs reviewed  Continue medications and treatments as listed in [facility]    Scribe Attestation  Christy DE LA CRUZ Scribe   attest that this documentation has been prepared under the direction and in the presence of MICHEL Canales    Provider Attestation - Scribe documentation  All medical record entries made by the Scribe were at my direction and personally dictated by me. I have reviewed the chart and agree that the record accurately reflects my personal performance of the history, physical exam, discussion and plan.   MICHEL Canales

## 2023-04-13 NOTE — LETTER
Patient: Amber De La Rosa  : 1940    Encounter Date: 2023    PROGRESS NOTE    Subjective  Chief complaint: Amber De La Rosa is a 83 y.o. male who is a acute skilled care patient being seen and evaluated for weakness.    HPI:  3/28/2023 82 years old male with past medical history of brain tumor, polyp, GERD, hyperlipidemia, hypertension.  Patient admitted to skilled nursing facility for therapy due to weakness after recent hospitalization for new onset left-sided weakness.  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  CT chest with 12 mm pulmonary nodule in the left lower lobe and 6 mm nodule in the right middle lobe.  Patient was admitted to the hospital and seen by neurology and neurosurgery.  He underwent surgery on brain and is now admitted to skilled nursing facility for therapy.    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  He is able to walk up to 10 feet in the parallel bars with moderate to maximum assist with close wheelchair follow.  He requires moderate to maximum assist for sit to stand with pulling up on the parallel bars.  Speech therapy is working with him on safe swallowing and cognition.  Daughter at bedside and reviewed medication regimen.  No new concerns today.    3/30/2023 patient is at skilled nursing facility for therapy.  He had follow-up with specialist yesterday and returned with new orders for memantine.  Nurse reported that the patient's labs were obtained at appointment and showed sodium 124.  Patient was placed on a fluid restriction and sent back to nursing facility.  Family at bedside today to discuss plan of care.  Family states patient has been more sleepy than usual today although notes that he did not sleep well last night.  Patient is awake and talking/interacting in conversation.      3/31/23 Patient working in therapy. He is doing well and in good spirits. He is getting stronger. Patient  able to walk up to 10 feet in the parallel bars with  moderate to maximum assist with close wheelchair follow. No acute distress at this time.     4/3/23 Patient is at SNF for therapy.  He is able to walk short distances up to 10 feet in parallel bars with moderate to maximum assist.  He is also working with speech therapy for dysphagia.  Patient states he feels well and has no new concerns today.    4/4/23 Continues working in therapy due to weakness.   Patient requires moderate assistance for transfers ADLs and mobility at this time.  Denies constitutional complaints.  No acute distress.    4/5/23  Patient has been working in therapy to improve strength, endurance, and ADLs.  Patient continues to work toward goals.  No new concerns today.  Denies n/v/f/c pain.      4/6/23  Therapy has been working with the patient to improve strength and endurance with ADLs, transfers, and mobility.  Patient continues to work toward goals.  Patient is stable and has no new complaints.  Nursing staff voices no new concerns today.    4/7/23  patient working in therapy due to weakness and debility.  Does not ambulate, requires moderate assistance for transfers. No acute distress or new concerns today.     4/10/2023 patient working in PT OT and ST.  He is able to walk 25 feet with use of haney rail with moderate to maximum assist.  He is working on gross motor coordination and weight shifting to improve safety with unsupported sit to stand.  Patient requires maximum assist for pivots.  He has no new concerns today.  Denies constitutional symptoms.    4/12/2023 patient continues to work towards goals in therapy.  Today he has complaint of low back pain aching type pain.  Otherwise feels okay.  Family at bedside and states that patient had virtual visit with neurologist yesterday who would like to make adjustments to his seizure medications and will be faxing orders to the nursing facility.    4/13/23  Patient working on transfer training today.  Transferred sit to  // bars with mod  assist x 2.  Also worked on standing activities in // bars.  Patient able to stand briefly 5-20 seconds x 4 trials with mod A x 2 poor+ balance.  Per therapist, patient was limited due to complaint of being tired.      Objective  Vital signs:   133/65, 96%,   Physical Exam  Constitutional:       General: He is not in acute distress.  Eyes:      Extraocular Movements: Extraocular movements intact.   Cardiovascular:      Rate and Rhythm: Regular rhythm.   Pulmonary:      Effort: Pulmonary effort is normal.      Breath sounds: Normal breath sounds.   Abdominal:      General: Bowel sounds are normal.      Palpations: Abdomen is soft.   Musculoskeletal:      Cervical back: Neck supple.      Right lower leg: No edema.      Left lower leg: No edema.      Comments: Left-sided weakness   Neurological:      Mental Status: He is alert.   Psychiatric:         Mood and Affect: Mood normal.         Behavior: Behavior is cooperative.         Assessment/Plan  Problem List Items Addressed This Visit       Brain mass     Follow-up with specialist           DM (diabetes mellitus) (CMS/[facility])     FBG at goal  Monitor BS         Dysphagia     Soft diet  Continue ST         Hypertension, essential     BP at goal  Continue antihypertensives  Monitor blood pressure         Weakness - Primary     Continue with therapy           Medications, treatments, and labs reviewed  Continue medications and treatments as listed in [facility]    Scribe Attestation  Christy DE LA CRUZ Scribe   attest that this documentation has been prepared under the direction and in the presence of MICHEL Canales    Provider Attestation - Scribe documentation  All medical record entries made by the Scribe were at my direction and personally dictated by me. I have reviewed the chart and agree that the record accurately reflects my personal performance of the history, physical exam, discussion and plan.   MICHEL Canales        Electronically Signed  By: Rachel Gaitan, APRN-CNP   4/18/23 12:38 PM

## 2023-04-14 ENCOUNTER — NURSING HOME VISIT (OUTPATIENT)
Dept: POST ACUTE CARE | Facility: EXTERNAL LOCATION | Age: 83
End: 2023-04-14
Payer: COMMERCIAL

## 2023-04-14 DIAGNOSIS — I10 HYPERTENSION, ESSENTIAL: ICD-10-CM

## 2023-04-14 DIAGNOSIS — R53.1 WEAKNESS: ICD-10-CM

## 2023-04-14 PROCEDURE — 99309 SBSQ NF CARE MODERATE MDM 30: CPT | Performed by: INTERNAL MEDICINE

## 2023-04-14 NOTE — PROGRESS NOTES
PROGRESS NOTE    Subjective   Chief complaint: Amber De La Rosa is a 82 y.o. male who is an acute skilled patient being seen and evaluated for weakness    HPI:  3/28/2023 82 years old male with past medical history of brain tumor, polyp, GERD, hyperlipidemia, hypertension.  Patient admitted to skilled nursing facility for therapy due to weakness after recent hospitalization for new onset left-sided weakness.  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  CT chest with 12 mm pulmonary nodule in the left lower lobe and 6 mm nodule in the right middle lobe.  Patient was admitted to the hospital and seen by neurology and neurosurgery.  He underwent surgery on brain and is now admitted to skilled nursing facility for therapy.    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  He is able to walk up to 10 feet in the parallel bars with moderate to maximum assist with close wheelchair follow.  He requires moderate to maximum assist for sit to stand with pulling up on the parallel bars.  Speech therapy is working with him on safe swallowing and cognition.  Daughter at bedside and reviewed medication regimen.  No new concerns today.    3/30/2023 patient is at skilled nursing facility for therapy.  He had follow-up with specialist yesterday and returned with new orders for memantine.  Nurse reported that the patient's labs were obtained at appointment and showed sodium 124.  Patient was placed on a fluid restriction and sent back to nursing facility.  Family at bedside today to discuss plan of care.  Family states patient has been more sleepy than usual today although notes that he did not sleep well last night.  Patient is awake and talking/interacting in conversation.      3/31/23 Patient working in therapy. He is doing well and in good spirits. He is getting stronger. Patient  able to walk up to 10 feet in the parallel bars with moderate to maximum assist with close wheelchair follow. No acute  distress at this time.     4/3/23 Patient is at SNF for therapy.  He is able to walk short distances up to 10 feet in parallel bars with moderate to maximum assist.  He is also working with speech therapy for dysphagia.  Patient states he feels well and has no new concerns today.    4/4/23 Continues working in therapy due to weakness.   Patient requires moderate assistance for transfers ADLs and mobility at this time.  Denies constitutional complaints.  No acute distress.    4/5/23  Patient has been working in therapy to improve strength, endurance, and ADLs.  Patient continues to work toward goals.  No new concerns today.  Denies n/v/f/c pain.      4/6/23  Therapy has been working with the patient to improve strength and endurance with ADLs, transfers, and mobility.  Patient continues to work toward goals.  Patient is stable and has no new complaints.  Nursing staff voices no new concerns today.    4/7/23  patient working in therapy due to weakness and debility.  Does not ambulate, requires moderate assistance for transfers. No acute distress or new concerns today.     4/10/2023 patient working in PT OT and ST.  He is able to walk 25 feet with use of haney rail with moderate to maximum assist.  He is working on gross motor coordination and weight shifting to improve safety with unsupported sit to stand.  Patient requires maximum assist for pivots.  He has no new concerns today.  Denies constitutional symptoms.    4/11/23 Patient is doing well and has no new complaints at this time. He is working with therapy and requires moderate to maximum assistance for all transfers. No acute distress.       Objective   Vital signs: 127/63, 65, 18, 96%     Physical Exam  Constitutional:       General: He is not in acute distress.  Eyes:      Extraocular Movements: Extraocular movements intact.   Cardiovascular:      Rate and Rhythm: Normal rate and regular rhythm.      Pulses: Normal pulses.      Heart sounds: Normal heart  sounds.   Pulmonary:      Effort: Pulmonary effort is normal.      Breath sounds: Normal breath sounds.   Abdominal:      General: Bowel sounds are normal.      Palpations: Abdomen is soft.   Musculoskeletal:      Cervical back: Normal range of motion and neck supple.      Right lower leg: Edema present.      Left lower leg: Edema present.   Neurological:      Mental Status: He is alert.   Psychiatric:         Mood and Affect: Mood normal.         Behavior: Behavior is cooperative.         Assessment/Plan   Problem List Items Addressed This Visit          Circulatory    Hypertension, essential     Continue antihypertensives  Monitor blood pressure            Endocrine/Metabolic    DM (diabetes mellitus) (CMS/[facility])     FBG at goal  Monitor BS            Other    Weakness     Continue therapy           Medications, treatments, and labs reviewed  Continue medications and treatments as listed in [facility]    Scribe Attestation  By signing my name below, I, Tru Brandt   attest that this documentation has been prepared under the direction and in the presence of Dasia Barton MD.    Provider Attestation - Scribe documentation  All medical record entries made by the Scribe were at my direction and personally dictated by me. I have reviewed the chart and agree that the record accurately reflects my personal performance of the history, physical exam, discussion and plan.

## 2023-04-14 NOTE — LETTER
Patient: Amber De La Rosa  : 1940    Encounter Date: 2023    PROGRESS NOTE    Subjective  Chief complaint: Amber De La Rosa is a 83 y.o. male who is an acute skilled patient being seen and evaluated for weakness    HPI:  3/28/2023 82 years old male with past medical history of brain tumor, polyp, GERD, hyperlipidemia, hypertension.  Patient admitted to skilled nursing facility for therapy due to weakness after recent hospitalization for new onset left-sided weakness.  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  CT chest with 12 mm pulmonary nodule in the left lower lobe and 6 mm nodule in the right middle lobe.  Patient was admitted to the hospital and seen by neurology and neurosurgery.  He underwent surgery on brain and is now admitted to skilled nursing facility for therapy.    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  He is able to walk up to 10 feet in the parallel bars with moderate to maximum assist with close wheelchair follow.  He requires moderate to maximum assist for sit to stand with pulling up on the parallel bars.  Speech therapy is working with him on safe swallowing and cognition.  Daughter at bedside and reviewed medication regimen.  No new concerns today.    3/30/2023 patient is at skilled nursing facility for therapy.  He had follow-up with specialist yesterday and returned with new orders for memantine.  Nurse reported that the patient's labs were obtained at appointment and showed sodium 124.  Patient was placed on a fluid restriction and sent back to nursing facility.  Family at bedside today to discuss plan of care.  Family states patient has been more sleepy than usual today although notes that he did not sleep well last night.  Patient is awake and talking/interacting in conversation.      3/31/23 Patient working in therapy. He is doing well and in good spirits. He is getting stronger. Patient  able to walk up to 10 feet in the parallel bars with  moderate to maximum assist with close wheelchair follow. No acute distress at this time.     4/3/23 Patient is at SNF for therapy.  He is able to walk short distances up to 10 feet in parallel bars with moderate to maximum assist.  He is also working with speech therapy for dysphagia.  Patient states he feels well and has no new concerns today.    4/4/23 Continues working in therapy due to weakness.   Patient requires moderate assistance for transfers ADLs and mobility at this time.  Denies constitutional complaints.  No acute distress.    4/5/23  Patient has been working in therapy to improve strength, endurance, and ADLs.  Patient continues to work toward goals.  No new concerns today.  Denies n/v/f/c pain.      4/6/23  Therapy has been working with the patient to improve strength and endurance with ADLs, transfers, and mobility.  Patient continues to work toward goals.  Patient is stable and has no new complaints.  Nursing staff voices no new concerns today.    4/7/23  patient working in therapy due to weakness and debility.  Does not ambulate, requires moderate assistance for transfers. No acute distress or new concerns today.     4/10/2023 patient working in PT OT and ST.  He is able to walk 25 feet with use of haney rail with moderate to maximum assist.  He is working on gross motor coordination and weight shifting to improve safety with unsupported sit to stand.  Patient requires maximum assist for pivots.  He has no new concerns today.  Denies constitutional symptoms.    4/12/2023 patient continues to work towards goals in therapy.  Today he has complaint of low back pain aching type pain.  Otherwise feels okay.  Family at bedside and states that patient had virtual visit with neurologist yesterday who would like to make adjustments to his seizure medications and will be faxing orders to the nursing facility.    4/13/23  Patient working on transfer training today.  Transferred sit to  // bars with mod  assist x 2.  Also worked on standing activities in // bars.  Patient able to stand briefly 5-20 seconds x 4 trials with mod A x 2 poor+ balance.  Per therapist, patient was limited due to complaint of being tired.    4/14/23 Patient continues to work in therapy.  Patient requires moderate assistance of 2 for transfers.  Patient working on standing trials, his balance is still poor.   No other issues or concerns.  No acute distress.      Objective  Vital signs: 133/76, 98%    Physical Exam  Constitutional:       General: He is not in acute distress.  Eyes:      Extraocular Movements: Extraocular movements intact.   Cardiovascular:      Rate and Rhythm: Normal rate and regular rhythm.   Pulmonary:      Effort: Pulmonary effort is normal.      Breath sounds: Normal breath sounds.   Abdominal:      General: Bowel sounds are normal.      Palpations: Abdomen is soft.   Musculoskeletal:         General: Normal range of motion.      Cervical back: Normal range of motion and neck supple.      Right lower leg: No edema.      Left lower leg: No edema.   Neurological:      Mental Status: He is alert.   Psychiatric:         Mood and Affect: Mood normal.         Behavior: Behavior is cooperative.         Assessment/Plan  Problem List Items Addressed This Visit          Circulatory    Hypertension, essential     BP at goal  Continue antihypertensives  Monitor blood pressure            Other    Weakness     Ability fluctuates  Continue with therapy           Medications, treatments, and labs reviewed  Continue medications and treatments as listed in [facility]    Dasia Barton MD    1. Weakness        2. Hypertension, essential             Scribe Attestation  By signing my name below, Nyla DE LA CRUZ Scribe   attest that this documentation has been prepared under the direction and in the presence of Dasia Barton MD.    Provider Attestation - Scribe documentation  All medical record entries made by the Scribe were at my direction  and personally dictated by me. I have reviewed the chart and agree that the record accurately reflects my personal performance of the history, physical exam, discussion and plan.      Electronically Signed By: Dasia Barton MD   4/19/23  5:50 PM

## 2023-04-17 ENCOUNTER — NURSING HOME VISIT (OUTPATIENT)
Dept: POST ACUTE CARE | Facility: EXTERNAL LOCATION | Age: 83
End: 2023-04-17
Payer: COMMERCIAL

## 2023-04-17 DIAGNOSIS — R53.1 WEAKNESS: Primary | ICD-10-CM

## 2023-04-17 DIAGNOSIS — E11.69 TYPE 2 DIABETES MELLITUS WITH OTHER SPECIFIED COMPLICATION, WITH LONG-TERM CURRENT USE OF INSULIN (MULTI): ICD-10-CM

## 2023-04-17 DIAGNOSIS — R13.10 DYSPHAGIA, UNSPECIFIED TYPE: ICD-10-CM

## 2023-04-17 DIAGNOSIS — Z79.4 TYPE 2 DIABETES MELLITUS WITH OTHER SPECIFIED COMPLICATION, WITH LONG-TERM CURRENT USE OF INSULIN (MULTI): ICD-10-CM

## 2023-04-17 DIAGNOSIS — I10 HYPERTENSION, ESSENTIAL: ICD-10-CM

## 2023-04-17 DIAGNOSIS — G93.89 BRAIN MASS: ICD-10-CM

## 2023-04-17 PROCEDURE — 99309 SBSQ NF CARE MODERATE MDM 30: CPT | Performed by: NURSE PRACTITIONER

## 2023-04-17 NOTE — LETTER
Patient: Amber De La Rosa  : 1940    Encounter Date: 2023    PROGRESS NOTE    Subjective  Chief complaint: Amber De La Rosa is a 83 y.o. male who is a acute skilled care patient being seen and evaluated for weakness.    HPI:  3/28/2023 82 years old male with past medical history of brain tumor, polyp, GERD, hyperlipidemia, hypertension.  Patient admitted to skilled nursing facility for therapy due to weakness after recent hospitalization for new onset left-sided weakness.  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  CT chest with 12 mm pulmonary nodule in the left lower lobe and 6 mm nodule in the right middle lobe.  Patient was admitted to the hospital and seen by neurology and neurosurgery.  He underwent surgery on brain and is now admitted to skilled nursing facility for therapy.    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  He is able to walk up to 10 feet in the parallel bars with moderate to maximum assist with close wheelchair follow.  He requires moderate to maximum assist for sit to stand with pulling up on the parallel bars.  Speech therapy is working with him on safe swallowing and cognition.  Daughter at bedside and reviewed medication regimen.  No new concerns today.    3/30/2023 patient is at skilled nursing facility for therapy.  He had follow-up with specialist yesterday and returned with new orders for memantine.  Nurse reported that the patient's labs were obtained at appointment and showed sodium 124.  Patient was placed on a fluid restriction and sent back to nursing facility.  Family at bedside today to discuss plan of care.  Family states patient has been more sleepy than usual today although notes that he did not sleep well last night.  Patient is awake and talking/interacting in conversation.      3/31/23 Patient working in therapy. He is doing well and in good spirits. He is getting stronger. Patient  able to walk up to 10 feet in the parallel bars with  moderate to maximum assist with close wheelchair follow. No acute distress at this time.     4/3/23 Patient is at SNF for therapy.  He is able to walk short distances up to 10 feet in parallel bars with moderate to maximum assist.  He is also working with speech therapy for dysphagia.  Patient states he feels well and has no new concerns today.    4/4/23 Continues working in therapy due to weakness.   Patient requires moderate assistance for transfers ADLs and mobility at this time.  Denies constitutional complaints.  No acute distress.    4/5/23  Patient has been working in therapy to improve strength, endurance, and ADLs.  Patient continues to work toward goals.  No new concerns today.  Denies n/v/f/c pain.      4/6/23  Therapy has been working with the patient to improve strength and endurance with ADLs, transfers, and mobility.  Patient continues to work toward goals.  Patient is stable and has no new complaints.  Nursing staff voices no new concerns today.    4/7/23  patient working in therapy due to weakness and debility.  Does not ambulate, requires moderate assistance for transfers. No acute distress or new concerns today.     4/10/2023 patient working in PT OT and ST.  He is able to walk 25 feet with use of haney rail with moderate to maximum assist.  He is working on gross motor coordination and weight shifting to improve safety with unsupported sit to stand.  Patient requires maximum assist for pivots.  He has no new concerns today.  Denies constitutional symptoms.    4/12/2023 patient continues to work towards goals in therapy.  Today he has complaint of low back pain aching type pain.  Otherwise feels okay.  Family at bedside and states that patient had virtual visit with neurologist yesterday who would like to make adjustments to his seizure medications and will be faxing orders to the nursing facility.    4/13/23  Patient working on transfer training today.  Transferred sit to  // bars with mod  assist x 2.  Also worked on standing activities in // bars.  Patient able to stand briefly 5-20 seconds x 4 trials with mod A x 2 poor+ balance.  Per therapist, patient was limited due to complaint of being tired.    4/17/23  Patient continues to work in therapy.  Worked on balance and standing activities in therapy.  Also worked on gait training.  Ambulated  5 to 10 feet with handrail and max assist x2.   Sit to stand performed with moderate/max assist x2 and pivots max assist x2.  Patient needs max encouragement to participate to full capability per therapist.      Objective  Vital signs:   124/77, 97.8, 74, 18, 98%,   Physical Exam  Constitutional:       General: He is not in acute distress.  Eyes:      Extraocular Movements: Extraocular movements intact.   Cardiovascular:      Rate and Rhythm: Regular rhythm.   Pulmonary:      Effort: Pulmonary effort is normal.      Breath sounds: Normal breath sounds.   Abdominal:      General: Bowel sounds are normal.      Palpations: Abdomen is soft.   Musculoskeletal:      Cervical back: Neck supple.      Right lower leg: No edema.      Left lower leg: No edema.      Comments: Left-sided weakness  Tenderness over the lower mid back   Neurological:      Mental Status: He is alert.   Psychiatric:         Mood and Affect: Mood normal.         Behavior: Behavior is cooperative.         Assessment/Plan  Problem List Items Addressed This Visit       Brain mass     Follow-up with specialist           DM (diabetes mellitus) (CMS/[facility])     FBG at goal  Monitor BS         Dysphagia     Soft diet  Continue ST         Hypertension, essential     BP at goal  Continue antihypertensives  Monitor blood pressure         Weakness - Primary     Ability fluctuates  Continue with therapy           Medications, treatments, and labs reviewed  Continue medications and treatments as listed in [facility]    Scribe Attestation  I, Tru Rodriguez   attest that this documentation has been prepared  under the direction and in the presence of MICHEL Canales    Provider Attestation - Scribe documentation  All medical record entries made by the Scribe were at my direction and personally dictated by me. I have reviewed the chart and agree that the record accurately reflects my personal performance of the history, physical exam, discussion and plan.   MICHEL Canales        Electronically Signed By: MICHEL Canales   4/18/23  5:16 PM

## 2023-04-17 NOTE — PROGRESS NOTES
PROGRESS NOTE    Subjective   Chief complaint: Amber De La Rosa is a 83 y.o. male who is a acute skilled care patient being seen and evaluated for weakness.    HPI:  3/28/2023 82 years old male with past medical history of brain tumor, polyp, GERD, hyperlipidemia, hypertension.  Patient admitted to skilled nursing facility for therapy due to weakness after recent hospitalization for new onset left-sided weakness.  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  CT chest with 12 mm pulmonary nodule in the left lower lobe and 6 mm nodule in the right middle lobe.  Patient was admitted to the hospital and seen by neurology and neurosurgery.  He underwent surgery on brain and is now admitted to skilled nursing facility for therapy.    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  He is able to walk up to 10 feet in the parallel bars with moderate to maximum assist with close wheelchair follow.  He requires moderate to maximum assist for sit to stand with pulling up on the parallel bars.  Speech therapy is working with him on safe swallowing and cognition.  Daughter at bedside and reviewed medication regimen.  No new concerns today.    3/30/2023 patient is at skilled nursing facility for therapy.  He had follow-up with specialist yesterday and returned with new orders for memantine.  Nurse reported that the patient's labs were obtained at appointment and showed sodium 124.  Patient was placed on a fluid restriction and sent back to nursing facility.  Family at bedside today to discuss plan of care.  Family states patient has been more sleepy than usual today although notes that he did not sleep well last night.  Patient is awake and talking/interacting in conversation.      3/31/23 Patient working in therapy. He is doing well and in good spirits. He is getting stronger. Patient  able to walk up to 10 feet in the parallel bars with moderate to maximum assist with close wheelchair follow. No acute  distress at this time.     4/3/23 Patient is at SNF for therapy.  He is able to walk short distances up to 10 feet in parallel bars with moderate to maximum assist.  He is also working with speech therapy for dysphagia.  Patient states he feels well and has no new concerns today.    4/4/23 Continues working in therapy due to weakness.   Patient requires moderate assistance for transfers ADLs and mobility at this time.  Denies constitutional complaints.  No acute distress.    4/5/23  Patient has been working in therapy to improve strength, endurance, and ADLs.  Patient continues to work toward goals.  No new concerns today.  Denies n/v/f/c pain.      4/6/23  Therapy has been working with the patient to improve strength and endurance with ADLs, transfers, and mobility.  Patient continues to work toward goals.  Patient is stable and has no new complaints.  Nursing staff voices no new concerns today.    4/7/23  patient working in therapy due to weakness and debility.  Does not ambulate, requires moderate assistance for transfers. No acute distress or new concerns today.     4/10/2023 patient working in PT OT and ST.  He is able to walk 25 feet with use of haney rail with moderate to maximum assist.  He is working on gross motor coordination and weight shifting to improve safety with unsupported sit to stand.  Patient requires maximum assist for pivots.  He has no new concerns today.  Denies constitutional symptoms.    4/12/2023 patient continues to work towards goals in therapy.  Today he has complaint of low back pain aching type pain.  Otherwise feels okay.  Family at bedside and states that patient had virtual visit with neurologist yesterday who would like to make adjustments to his seizure medications and will be faxing orders to the nursing facility.    4/13/23  Patient working on transfer training today.  Transferred sit to  // bars with mod assist x 2.  Also worked on standing activities in // bars.   Patient able to stand briefly 5-20 seconds x 4 trials with mod A x 2 poor+ balance.  Per therapist, patient was limited due to complaint of being tired.    4/17/23  Patient continues to work in therapy.  Worked on balance and standing activities in therapy.  Also worked on gait training.  Ambulated  5 to 10 feet with handrail and max assist x2.   Sit to stand performed with moderate/max assist x2 and pivots max assist x2.  Patient needs max encouragement to participate to full capability per therapist.      Objective   Vital signs:   124/77, 97.8, 74, 18, 98%,   Physical Exam  Constitutional:       General: He is not in acute distress.  Eyes:      Extraocular Movements: Extraocular movements intact.   Cardiovascular:      Rate and Rhythm: Regular rhythm.   Pulmonary:      Effort: Pulmonary effort is normal.      Breath sounds: Normal breath sounds.   Abdominal:      General: Bowel sounds are normal.      Palpations: Abdomen is soft.   Musculoskeletal:      Cervical back: Neck supple.      Right lower leg: No edema.      Left lower leg: No edema.      Comments: Left-sided weakness  Tenderness over the lower mid back   Neurological:      Mental Status: He is alert.   Psychiatric:         Mood and Affect: Mood normal.         Behavior: Behavior is cooperative.         Assessment/Plan   Problem List Items Addressed This Visit       Brain mass     Follow-up with specialist           DM (diabetes mellitus) (CMS/[facility])     FBG at goal  Monitor BS         Dysphagia     Soft diet  Continue ST         Hypertension, essential     BP at goal  Continue antihypertensives  Monitor blood pressure         Weakness - Primary     Ability fluctuates  Continue with therapy           Medications, treatments, and labs reviewed  Continue medications and treatments as listed in [facility]    Scribe Attestation  DOROTHY, Tru Rodriguez   attest that this documentation has been prepared under the direction and in the presence of Rachel BLACKBURN  MICHEL Gaitan    Provider Attestation - Scribe documentation  All medical record entries made by the Scribe were at my direction and personally dictated by me. I have reviewed the chart and agree that the record accurately reflects my personal performance of the history, physical exam, discussion and plan.   MICHEL Canales

## 2023-04-18 ENCOUNTER — NURSING HOME VISIT (OUTPATIENT)
Dept: POST ACUTE CARE | Facility: EXTERNAL LOCATION | Age: 83
End: 2023-04-18
Payer: COMMERCIAL

## 2023-04-18 DIAGNOSIS — R53.1 WEAKNESS: ICD-10-CM

## 2023-04-18 DIAGNOSIS — I10 HYPERTENSION, ESSENTIAL: ICD-10-CM

## 2023-04-18 PROCEDURE — 99309 SBSQ NF CARE MODERATE MDM 30: CPT | Performed by: INTERNAL MEDICINE

## 2023-04-18 NOTE — LETTER
Patient: Amber De La Rosa  : 1940    Encounter Date: 2023    PROGRESS NOTE    Subjective  Chief complaint: Amber De La Rosa is a 83 y.o. male who is an acute skilled patient being seen and evaluated for weakness    HPI:  3/28/2023 82 years old male with past medical history of brain tumor, polyp, GERD, hyperlipidemia, hypertension.  Patient admitted to skilled nursing facility for therapy due to weakness after recent hospitalization for new onset left-sided weakness.  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  CT chest with 12 mm pulmonary nodule in the left lower lobe and 6 mm nodule in the right middle lobe.  Patient was admitted to the hospital and seen by neurology and neurosurgery.  He underwent surgery on brain and is now admitted to skilled nursing facility for therapy.    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  He is able to walk up to 10 feet in the parallel bars with moderate to maximum assist with close wheelchair follow.  He requires moderate to maximum assist for sit to stand with pulling up on the parallel bars.  Speech therapy is working with him on safe swallowing and cognition.  Daughter at bedside and reviewed medication regimen.  No new concerns today.    3/30/2023 patient is at skilled nursing facility for therapy.  He had follow-up with specialist yesterday and returned with new orders for memantine.  Nurse reported that the patient's labs were obtained at appointment and showed sodium 124.  Patient was placed on a fluid restriction and sent back to nursing facility.  Family at bedside today to discuss plan of care.  Family states patient has been more sleepy than usual today although notes that he did not sleep well last night.  Patient is awake and talking/interacting in conversation.      3/31/23 Patient working in therapy. He is doing well and in good spirits. He is getting stronger. Patient  able to walk up to 10 feet in the parallel bars with  moderate to maximum assist with close wheelchair follow. No acute distress at this time.     4/3/23 Patient is at SNF for therapy.  He is able to walk short distances up to 10 feet in parallel bars with moderate to maximum assist.  He is also working with speech therapy for dysphagia.  Patient states he feels well and has no new concerns today.    4/4/23 Continues working in therapy due to weakness.   Patient requires moderate assistance for transfers ADLs and mobility at this time.  Denies constitutional complaints.  No acute distress.    4/5/23  Patient has been working in therapy to improve strength, endurance, and ADLs.  Patient continues to work toward goals.  No new concerns today.  Denies n/v/f/c pain.      4/6/23  Therapy has been working with the patient to improve strength and endurance with ADLs, transfers, and mobility.  Patient continues to work toward goals.  Patient is stable and has no new complaints.  Nursing staff voices no new concerns today.    4/7/23  patient working in therapy due to weakness and debility.  Does not ambulate, requires moderate assistance for transfers. No acute distress or new concerns today.     4/10/2023 patient working in PT OT and ST.  He is able to walk 25 feet with use of haney rail with moderate to maximum assist.  He is working on gross motor coordination and weight shifting to improve safety with unsupported sit to stand.  Patient requires maximum assist for pivots.  He has no new concerns today.  Denies constitutional symptoms.    4/12/2023 patient continues to work towards goals in therapy.  Today he has complaint of low back pain aching type pain.  Otherwise feels okay.  Family at bedside and states that patient had virtual visit with neurologist yesterday who would like to make adjustments to his seizure medications and will be faxing orders to the nursing facility.    4/13/23  Patient working on transfer training today.  Transferred sit to  // bars with mod  assist x 2.  Also worked on standing activities in // bars.  Patient able to stand briefly 5-20 seconds x 4 trials with mod A x 2 poor+ balance.  Per therapist, patient was limited due to complaint of being tired.    4/17/23  Patient continues to work in therapy.  Worked on balance and standing activities in therapy.  Also worked on gait training.  Ambulated  5 to 10 feet with handrail and max assist x2.   Sit to stand performed with moderate/max assist x2 and pivots max assist x2.  Patient needs max encouragement to participate to full capability per therapist.    4/18/23  patient continues to participate in therapy.  Patient is ambulating up to 10 feet with handrail and max assist of 2 staff.  He requires moderate to maximum assist for transfers.  No new issues or concerns.  No acute distress.      Objective  Vital signs:   129/67, 97%    Physical Exam  Constitutional:       General: He is not in acute distress.  Eyes:      Extraocular Movements: Extraocular movements intact.   Cardiovascular:      Rate and Rhythm: Normal rate and regular rhythm.   Pulmonary:      Effort: Pulmonary effort is normal.      Breath sounds: Normal breath sounds.   Abdominal:      General: Bowel sounds are normal.      Palpations: Abdomen is soft.   Musculoskeletal:         General: Normal range of motion.      Cervical back: Normal range of motion and neck supple.      Right lower leg: No edema.      Left lower leg: No edema.   Neurological:      Mental Status: He is alert.   Psychiatric:         Mood and Affect: Mood normal.         Behavior: Behavior is cooperative.         Assessment/Plan  Problem List Items Addressed This Visit          Circulatory    Hypertension, essential     BP at goal  Continue antihypertensives  Monitor blood pressure            Other    Weakness     Ability fluctuates  Continue with therapy           Medications, treatments, and labs reviewed  Continue medications and treatments as listed in [facility]    Dasia HASSAN  MD [place]    1. Hypertension, essential        2. Weakness             Scribe Attestation  By signing my name below, I, Tru Brandt   attest that this documentation has been prepared under the direction and in the presence of Dasia Barton MD.    Provider Attestation - Scribe documentation  All medical record entries made by the Scribe were at my direction and personally dictated by me. I have reviewed the chart and agree that the record accurately reflects my personal performance of the history, physical exam, discussion and plan.      Electronically Signed By: Dasia Barton MD   4/20/23  6:17 PM

## 2023-04-19 ENCOUNTER — NURSING HOME VISIT (OUTPATIENT)
Dept: POST ACUTE CARE | Facility: EXTERNAL LOCATION | Age: 83
End: 2023-04-19
Payer: COMMERCIAL

## 2023-04-19 DIAGNOSIS — E11.69 TYPE 2 DIABETES MELLITUS WITH OTHER SPECIFIED COMPLICATION, WITH LONG-TERM CURRENT USE OF INSULIN (MULTI): ICD-10-CM

## 2023-04-19 DIAGNOSIS — I10 HYPERTENSION, ESSENTIAL: ICD-10-CM

## 2023-04-19 DIAGNOSIS — R53.1 WEAKNESS: ICD-10-CM

## 2023-04-19 DIAGNOSIS — G93.89 BRAIN MASS: ICD-10-CM

## 2023-04-19 DIAGNOSIS — R13.10 DYSPHAGIA, UNSPECIFIED TYPE: ICD-10-CM

## 2023-04-19 DIAGNOSIS — Z79.4 TYPE 2 DIABETES MELLITUS WITH OTHER SPECIFIED COMPLICATION, WITH LONG-TERM CURRENT USE OF INSULIN (MULTI): ICD-10-CM

## 2023-04-19 DIAGNOSIS — M54.50 LOW BACK PAIN, UNSPECIFIED BACK PAIN LATERALITY, UNSPECIFIED CHRONICITY, UNSPECIFIED WHETHER SCIATICA PRESENT: ICD-10-CM

## 2023-04-19 DIAGNOSIS — R53.1 WEAKNESS: Primary | ICD-10-CM

## 2023-04-19 PROCEDURE — 99316 NF DSCHRG MGMT 30 MIN+: CPT | Performed by: NURSE PRACTITIONER

## 2023-04-19 NOTE — PROGRESS NOTES
PROGRESS NOTE    Subjective   Chief complaint: Amber De La Rosa is a 83 y.o. male who is an acute skilled patient being seen and evaluated for weakness    HPI:  3/28/2023 82 years old male with past medical history of brain tumor, polyp, GERD, hyperlipidemia, hypertension.  Patient admitted to skilled nursing facility for therapy due to weakness after recent hospitalization for new onset left-sided weakness.  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  CT chest with 12 mm pulmonary nodule in the left lower lobe and 6 mm nodule in the right middle lobe.  Patient was admitted to the hospital and seen by neurology and neurosurgery.  He underwent surgery on brain and is now admitted to skilled nursing facility for therapy.    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  He is able to walk up to 10 feet in the parallel bars with moderate to maximum assist with close wheelchair follow.  He requires moderate to maximum assist for sit to stand with pulling up on the parallel bars.  Speech therapy is working with him on safe swallowing and cognition.  Daughter at bedside and reviewed medication regimen.  No new concerns today.    3/30/2023 patient is at skilled nursing facility for therapy.  He had follow-up with specialist yesterday and returned with new orders for memantine.  Nurse reported that the patient's labs were obtained at appointment and showed sodium 124.  Patient was placed on a fluid restriction and sent back to nursing facility.  Family at bedside today to discuss plan of care.  Family states patient has been more sleepy than usual today although notes that he did not sleep well last night.  Patient is awake and talking/interacting in conversation.      3/31/23 Patient working in therapy. He is doing well and in good spirits. He is getting stronger. Patient  able to walk up to 10 feet in the parallel bars with moderate to maximum assist with close wheelchair follow. No acute  distress at this time.     4/3/23 Patient is at SNF for therapy.  He is able to walk short distances up to 10 feet in parallel bars with moderate to maximum assist.  He is also working with speech therapy for dysphagia.  Patient states he feels well and has no new concerns today.    4/4/23 Continues working in therapy due to weakness.   Patient requires moderate assistance for transfers ADLs and mobility at this time.  Denies constitutional complaints.  No acute distress.    4/5/23  Patient has been working in therapy to improve strength, endurance, and ADLs.  Patient continues to work toward goals.  No new concerns today.  Denies n/v/f/c pain.      4/6/23  Therapy has been working with the patient to improve strength and endurance with ADLs, transfers, and mobility.  Patient continues to work toward goals.  Patient is stable and has no new complaints.  Nursing staff voices no new concerns today.    4/7/23  patient working in therapy due to weakness and debility.  Does not ambulate, requires moderate assistance for transfers. No acute distress or new concerns today.     4/10/2023 patient working in PT OT and ST.  He is able to walk 25 feet with use of haney rail with moderate to maximum assist.  He is working on gross motor coordination and weight shifting to improve safety with unsupported sit to stand.  Patient requires maximum assist for pivots.  He has no new concerns today.  Denies constitutional symptoms.    4/12/2023 patient continues to work towards goals in therapy.  Today he has complaint of low back pain aching type pain.  Otherwise feels okay.  Family at bedside and states that patient had virtual visit with neurologist yesterday who would like to make adjustments to his seizure medications and will be faxing orders to the nursing facility.    4/13/23  Patient working on transfer training today.  Transferred sit to  // bars with mod assist x 2.  Also worked on standing activities in // bars.   Patient able to stand briefly 5-20 seconds x 4 trials with mod A x 2 poor+ balance.  Per therapist, patient was limited due to complaint of being tired.    4/14/23 Patient continues to work in therapy.  Patient requires moderate assistance of 2 for transfers.  Patient working on standing trials, his balance is still poor.   No other issues or concerns.  No acute distress.      Objective   Vital signs: 133/76, 98%    Physical Exam  Constitutional:       General: He is not in acute distress.  Eyes:      Extraocular Movements: Extraocular movements intact.   Cardiovascular:      Rate and Rhythm: Normal rate and regular rhythm.   Pulmonary:      Effort: Pulmonary effort is normal.      Breath sounds: Normal breath sounds.   Abdominal:      General: Bowel sounds are normal.      Palpations: Abdomen is soft.   Musculoskeletal:         General: Normal range of motion.      Cervical back: Normal range of motion and neck supple.      Right lower leg: No edema.      Left lower leg: No edema.   Neurological:      Mental Status: He is alert.   Psychiatric:         Mood and Affect: Mood normal.         Behavior: Behavior is cooperative.         Assessment/Plan   Problem List Items Addressed This Visit          Circulatory    Hypertension, essential     BP at goal  Continue antihypertensives  Monitor blood pressure            Other    Weakness     Ability fluctuates  Continue with therapy           Medications, treatments, and labs reviewed  Continue medications and treatments as listed in PCC    Dasia Barton MD    1. Weakness        2. Hypertension, essential             Scribe Attestation  By signing my name below, INyla Scribe   attest that this documentation has been prepared under the direction and in the presence of Dasia Barton MD.    Provider Attestation - Scribe documentation  All medical record entries made by the Scribe were at my direction and personally dictated by me. I have reviewed the chart and  agree that the record accurately reflects my personal performance of the history, physical exam, discussion and plan.

## 2023-04-19 NOTE — LETTER
Patient: Amber De La Rosa  : 1940    Encounter Date: 2023    PROGRESS NOTE    Subjective  Chief complaint: Amber De La Rosa is a 83 y.o. male who is an acute skilled patient being seen and evaluated for weakness    HPI:  3/28/2023 82 years old male with past medical history of brain tumor, polyp, GERD, hyperlipidemia, hypertension.  Patient admitted to skilled nursing facility for therapy due to weakness after recent hospitalization for new onset left-sided weakness.  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  CT chest with 12 mm pulmonary nodule in the left lower lobe and 6 mm nodule in the right middle lobe.  Patient was admitted to the hospital and seen by neurology and neurosurgery.  He underwent surgery on brain and is now admitted to skilled nursing facility for therapy.    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  He is able to walk up to 10 feet in the parallel bars with moderate to maximum assist with close wheelchair follow.  He requires moderate to maximum assist for sit to stand with pulling up on the parallel bars.  Speech therapy is working with him on safe swallowing and cognition.  Daughter at bedside and reviewed medication regimen.  No new concerns today.    3/30/2023 patient is at skilled nursing facility for therapy.  He had follow-up with specialist yesterday and returned with new orders for memantine.  Nurse reported that the patient's labs were obtained at appointment and showed sodium 124.  Patient was placed on a fluid restriction and sent back to nursing facility.  Family at bedside today to discuss plan of care.  Family states patient has been more sleepy than usual today although notes that he did not sleep well last night.  Patient is awake and talking/interacting in conversation.      3/31/23 Patient working in therapy. He is doing well and in good spirits. He is getting stronger. Patient  able to walk up to 10 feet in the parallel bars with  moderate to maximum assist with close wheelchair follow. No acute distress at this time.     4/3/23 Patient is at SNF for therapy.  He is able to walk short distances up to 10 feet in parallel bars with moderate to maximum assist.  He is also working with speech therapy for dysphagia.  Patient states he feels well and has no new concerns today.    4/4/23 Continues working in therapy due to weakness.   Patient requires moderate assistance for transfers ADLs and mobility at this time.  Denies constitutional complaints.  No acute distress.    4/5/23  Patient has been working in therapy to improve strength, endurance, and ADLs.  Patient continues to work toward goals.  No new concerns today.  Denies n/v/f/c pain.      4/6/23  Therapy has been working with the patient to improve strength and endurance with ADLs, transfers, and mobility.  Patient continues to work toward goals.  Patient is stable and has no new complaints.  Nursing staff voices no new concerns today.    4/7/23  patient working in therapy due to weakness and debility.  Does not ambulate, requires moderate assistance for transfers. No acute distress or new concerns today.     4/10/2023 patient working in PT OT and ST.  He is able to walk 25 feet with use of haney rail with moderate to maximum assist.  He is working on gross motor coordination and weight shifting to improve safety with unsupported sit to stand.  Patient requires maximum assist for pivots.  He has no new concerns today.  Denies constitutional symptoms.    4/12/2023 patient continues to work towards goals in therapy.  Today he has complaint of low back pain aching type pain.  Otherwise feels okay.  Family at bedside and states that patient had virtual visit with neurologist yesterday who would like to make adjustments to his seizure medications and will be faxing orders to the nursing facility.    4/13/23  Patient working on transfer training today.  Transferred sit to  // bars with mod  assist x 2.  Also worked on standing activities in // bars.  Patient able to stand briefly 5-20 seconds x 4 trials with mod A x 2 poor+ balance.  Per therapist, patient was limited due to complaint of being tired.    4/17/23  Patient continues to work in therapy.  Worked on balance and standing activities in therapy.  Also worked on gait training.  Ambulated  5 to 10 feet with handrail and max assist x2.   Sit to stand performed with moderate/max assist x2 and pivots max assist x2.  Patient needs max encouragement to participate to full capability per therapist.    4/19/2023 patient has been working in therapy.  Last contact was yesterday and family is requesting transfer to acute rehab.  Apparently patient was approved for acute rehab initially but came to the skilled nursing facility instead while he was receiving his radiation treatment.  I was asked to do [id number] today for transfer to the acute rehab.  I spoke with  At [facility] who states that patient was approved and can advance there anytime.  At [facility] nursing facility patient has had a decline according to the therapist.  He is able to walk about 40 feet with assist.  Family at bedside.  I discussed with family who is aware and understands.      Objective  Vital signs: 120/64, 97.5, 70, 16, 97%, blood sugar 137    Physical Exam  Constitutional:       General: He is not in acute distress.  Eyes:      Extraocular Movements: Extraocular movements intact.   Cardiovascular:      Rate and Rhythm: Regular rhythm.   Pulmonary:      Effort: Pulmonary effort is normal.      Breath sounds: Normal breath sounds.   Abdominal:      General: Bowel sounds are normal.      Palpations: Abdomen is soft.   Musculoskeletal:      Cervical back: Neck supple.      Right lower leg: No edema.      Left lower leg: No edema.      Comments: Left-sided weakness   Neurological:      Mental Status: He is alert.   Psychiatric:         Mood and Affect: Mood normal.          Behavior: Behavior is cooperative.       Admitting/DC dx:  Assessment/Plan  Problem List Items Addressed This Visit       Brain mass    DM (diabetes mellitus) (CMS/HCC)    Dysphagia    Hypertension, essential    Weakness - Primary   Prognosis-fair  Course-PT OT ST  Plan-DC to acute rehab today  Medications, treatments, and labs reviewed  Continue medications and treatments as listed in PCC    Discussed POC with family who is present at bedside and agrees with plan    Time spent >30 min    MICHEL Canales      Electronically Signed By: MICHEL Canales   4/23/23  9:40 AM

## 2023-04-19 NOTE — PROGRESS NOTES
PROGRESS NOTE    Subjective   Chief complaint: Amber De La Rosa is a 83 y.o. male who is an acute skilled patient being seen and evaluated for weakness    HPI:  3/28/2023 82 years old male with past medical history of brain tumor, polyp, GERD, hyperlipidemia, hypertension.  Patient admitted to skilled nursing facility for therapy due to weakness after recent hospitalization for new onset left-sided weakness.  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  CT chest with 12 mm pulmonary nodule in the left lower lobe and 6 mm nodule in the right middle lobe.  Patient was admitted to the hospital and seen by neurology and neurosurgery.  He underwent surgery on brain and is now admitted to skilled nursing facility for therapy.    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  He is able to walk up to 10 feet in the parallel bars with moderate to maximum assist with close wheelchair follow.  He requires moderate to maximum assist for sit to stand with pulling up on the parallel bars.  Speech therapy is working with him on safe swallowing and cognition.  Daughter at bedside and reviewed medication regimen.  No new concerns today.    3/30/2023 patient is at skilled nursing facility for therapy.  He had follow-up with specialist yesterday and returned with new orders for memantine.  Nurse reported that the patient's labs were obtained at appointment and showed sodium 124.  Patient was placed on a fluid restriction and sent back to nursing facility.  Family at bedside today to discuss plan of care.  Family states patient has been more sleepy than usual today although notes that he did not sleep well last night.  Patient is awake and talking/interacting in conversation.      3/31/23 Patient working in therapy. He is doing well and in good spirits. He is getting stronger. Patient  able to walk up to 10 feet in the parallel bars with moderate to maximum assist with close wheelchair follow. No acute  distress at this time.     4/3/23 Patient is at SNF for therapy.  He is able to walk short distances up to 10 feet in parallel bars with moderate to maximum assist.  He is also working with speech therapy for dysphagia.  Patient states he feels well and has no new concerns today.    4/4/23 Continues working in therapy due to weakness.   Patient requires moderate assistance for transfers ADLs and mobility at this time.  Denies constitutional complaints.  No acute distress.    4/5/23  Patient has been working in therapy to improve strength, endurance, and ADLs.  Patient continues to work toward goals.  No new concerns today.  Denies n/v/f/c pain.      4/6/23  Therapy has been working with the patient to improve strength and endurance with ADLs, transfers, and mobility.  Patient continues to work toward goals.  Patient is stable and has no new complaints.  Nursing staff voices no new concerns today.    4/7/23  patient working in therapy due to weakness and debility.  Does not ambulate, requires moderate assistance for transfers. No acute distress or new concerns today.     4/10/2023 patient working in PT OT and ST.  He is able to walk 25 feet with use of haney rail with moderate to maximum assist.  He is working on gross motor coordination and weight shifting to improve safety with unsupported sit to stand.  Patient requires maximum assist for pivots.  He has no new concerns today.  Denies constitutional symptoms.    4/12/2023 patient continues to work towards goals in therapy.  Today he has complaint of low back pain aching type pain.  Otherwise feels okay.  Family at bedside and states that patient had virtual visit with neurologist yesterday who would like to make adjustments to his seizure medications and will be faxing orders to the nursing facility.    4/13/23  Patient working on transfer training today.  Transferred sit to  // bars with mod assist x 2.  Also worked on standing activities in // bars.   Patient able to stand briefly 5-20 seconds x 4 trials with mod A x 2 poor+ balance.  Per therapist, patient was limited due to complaint of being tired.    4/17/23  Patient continues to work in therapy.  Worked on balance and standing activities in therapy.  Also worked on gait training.  Ambulated  5 to 10 feet with handrail and max assist x2.   Sit to stand performed with moderate/max assist x2 and pivots max assist x2.  Patient needs max encouragement to participate to full capability per therapist.    4/19/2023 patient has been working in therapy.  Last contact was yesterday and family is requesting transfer to acute rehab.  Apparently patient was approved for acute rehab initially but came to the skilled nursing facility instead while he was receiving his radiation treatment.  I was asked to do [id number] today for transfer to the acute rehab.  I spoke with  At [facility] who states that patient was approved and can advance there anytime.  At [facility] nursing [facility] patient has had a decline according to the therapist.  He is able to walk about 40 feet with assist.  Family at bedside.  I discussed with family who is aware and understands.      Objective   Vital signs: 120/64, 97.5, 70, 16, 97%, blood sugar 137    Physical Exam  Constitutional:       General: He is not in acute distress.  Eyes:      Extraocular Movements: Extraocular movements intact.   Cardiovascular:      Rate and Rhythm: Regular rhythm.   Pulmonary:      Effort: Pulmonary effort is normal.      Breath sounds: Normal breath sounds.   Abdominal:      General: Bowel sounds are normal.      Palpations: Abdomen is soft.   Musculoskeletal:      Cervical back: Neck supple.      Right lower leg: No edema.      Left lower leg: No edema.      Comments: Left-sided weakness   Neurological:      Mental Status: He is alert.   Psychiatric:         Mood and Affect: Mood normal.         Behavior: Behavior is cooperative.       Admitting/DC  dx:  Assessment/Plan   Problem List Items Addressed This Visit       Brain mass    DM (diabetes mellitus) (CMS/HCC)    Dysphagia    Hypertension, essential    Weakness - Primary   Prognosis-fair  Course-PT OT ST  Plan-DC to acute rehab today  Medications, treatments, and labs reviewed  Continue medications and treatments as listed in PCC    Discussed POC with family who is present at bedside and agrees with plan    Time spent >30 min    Rachel Gaitan, APRN-CNP

## 2023-04-20 NOTE — PROGRESS NOTES
PROGRESS NOTE    Subjective   Chief complaint: Amber De La Rosa is a 83 y.o. male who is an acute skilled patient being seen and evaluated for weakness    HPI:  3/28/2023 82 years old male with past medical history of brain tumor, polyp, GERD, hyperlipidemia, hypertension.  Patient admitted to skilled nursing facility for therapy due to weakness after recent hospitalization for new onset left-sided weakness.  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  CT chest with 12 mm pulmonary nodule in the left lower lobe and 6 mm nodule in the right middle lobe.  Patient was admitted to the hospital and seen by neurology and neurosurgery.  He underwent surgery on brain and is now admitted to skilled nursing facility for therapy.    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  He is able to walk up to 10 feet in the parallel bars with moderate to maximum assist with close wheelchair follow.  He requires moderate to maximum assist for sit to stand with pulling up on the parallel bars.  Speech therapy is working with him on safe swallowing and cognition.  Daughter at bedside and reviewed medication regimen.  No new concerns today.    3/30/2023 patient is at skilled nursing facility for therapy.  He had follow-up with specialist yesterday and returned with new orders for memantine.  Nurse reported that the patient's labs were obtained at appointment and showed sodium 124.  Patient was placed on a fluid restriction and sent back to nursing facility.  Family at bedside today to discuss plan of care.  Family states patient has been more sleepy than usual today although notes that he did not sleep well last night.  Patient is awake and talking/interacting in conversation.      3/31/23 Patient working in therapy. He is doing well and in good spirits. He is getting stronger. Patient  able to walk up to 10 feet in the parallel bars with moderate to maximum assist with close wheelchair follow. No acute  distress at this time.     4/3/23 Patient is at SNF for therapy.  He is able to walk short distances up to 10 feet in parallel bars with moderate to maximum assist.  He is also working with speech therapy for dysphagia.  Patient states he feels well and has no new concerns today.    4/4/23 Continues working in therapy due to weakness.   Patient requires moderate assistance for transfers ADLs and mobility at this time.  Denies constitutional complaints.  No acute distress.    4/5/23  Patient has been working in therapy to improve strength, endurance, and ADLs.  Patient continues to work toward goals.  No new concerns today.  Denies n/v/f/c pain.      4/6/23  Therapy has been working with the patient to improve strength and endurance with ADLs, transfers, and mobility.  Patient continues to work toward goals.  Patient is stable and has no new complaints.  Nursing staff voices no new concerns today.    4/7/23  patient working in therapy due to weakness and debility.  Does not ambulate, requires moderate assistance for transfers. No acute distress or new concerns today.     4/10/2023 patient working in PT OT and ST.  He is able to walk 25 feet with use of haney rail with moderate to maximum assist.  He is working on gross motor coordination and weight shifting to improve safety with unsupported sit to stand.  Patient requires maximum assist for pivots.  He has no new concerns today.  Denies constitutional symptoms.    4/12/2023 patient continues to work towards goals in therapy.  Today he has complaint of low back pain aching type pain.  Otherwise feels okay.  Family at bedside and states that patient had virtual visit with neurologist yesterday who would like to make adjustments to his seizure medications and will be faxing orders to the nursing facility.    4/13/23  Patient working on transfer training today.  Transferred sit to  // bars with mod assist x 2.  Also worked on standing activities in // bars.   Patient able to stand briefly 5-20 seconds x 4 trials with mod A x 2 poor+ balance.  Per therapist, patient was limited due to complaint of being tired.    4/17/23  Patient continues to work in therapy.  Worked on balance and standing activities in therapy.  Also worked on gait training.  Ambulated  5 to 10 feet with handrail and max assist x2.   Sit to stand performed with moderate/max assist x2 and pivots max assist x2.  Patient needs max encouragement to participate to full capability per therapist.    4/18/23  patient continues to participate in therapy.  Patient is ambulating up to 10 feet with handrail and max assist of 2 staff.  He requires moderate to maximum assist for transfers.  No new issues or concerns.  No acute distress.      Objective   Vital signs:   129/67, 97%    Physical Exam  Constitutional:       General: He is not in acute distress.  Eyes:      Extraocular Movements: Extraocular movements intact.   Cardiovascular:      Rate and Rhythm: Normal rate and regular rhythm.   Pulmonary:      Effort: Pulmonary effort is normal.      Breath sounds: Normal breath sounds.   Abdominal:      General: Bowel sounds are normal.      Palpations: Abdomen is soft.   Musculoskeletal:         General: Normal range of motion.      Cervical back: Normal range of motion and neck supple.      Right lower leg: No edema.      Left lower leg: No edema.   Neurological:      Mental Status: He is alert.   Psychiatric:         Mood and Affect: Mood normal.         Behavior: Behavior is cooperative.         Assessment/Plan   Problem List Items Addressed This Visit          Circulatory    Hypertension, essential     BP at goal  Continue antihypertensives  Monitor blood pressure            Other    Weakness     Ability fluctuates  Continue with therapy           Medications, treatments, and labs reviewed  Continue medications and treatments as listed in [facility]    Dasia Barton MD    1. Hypertension, essential        2. Weakness              Scribe Attestation  By signing my name below, I, Tru Brandt   attest that this documentation has been prepared under the direction and in the presence of Dasia Barton MD.    Provider Attestation - Scribe documentation  All medical record entries made by the Scribe were at my direction and personally dictated by me. I have reviewed the chart and agree that the record accurately reflects my personal performance of the history, physical exam, discussion and plan.